# Patient Record
Sex: FEMALE | Race: WHITE | NOT HISPANIC OR LATINO | URBAN - METROPOLITAN AREA
[De-identification: names, ages, dates, MRNs, and addresses within clinical notes are randomized per-mention and may not be internally consistent; named-entity substitution may affect disease eponyms.]

---

## 2018-07-26 ENCOUNTER — CONVERSION ENCOUNTER (OUTPATIENT)
Dept: SURGERY | Facility: CLINIC | Age: 58
End: 2018-07-26

## 2018-07-26 ENCOUNTER — OFFICE VISIT CONVERTED (OUTPATIENT)
Dept: SURGERY | Facility: CLINIC | Age: 58
End: 2018-07-26
Attending: NURSE PRACTITIONER

## 2018-08-13 ENCOUNTER — OFFICE VISIT CONVERTED (OUTPATIENT)
Dept: SURGERY | Facility: CLINIC | Age: 58
End: 2018-08-13
Attending: SURGERY

## 2018-08-16 ENCOUNTER — OFFICE VISIT CONVERTED (OUTPATIENT)
Dept: ONCOLOGY | Facility: HOSPITAL | Age: 58
End: 2018-08-16
Attending: INTERNAL MEDICINE

## 2018-08-21 ENCOUNTER — OFFICE VISIT CONVERTED (OUTPATIENT)
Dept: ONCOLOGY | Facility: HOSPITAL | Age: 58
End: 2018-08-21
Attending: THORACIC SURGERY (CARDIOTHORACIC VASCULAR SURGERY)

## 2018-08-28 ENCOUNTER — TELEPHONE CONVERTED (OUTPATIENT)
Dept: ONCOLOGY | Facility: HOSPITAL | Age: 58
End: 2018-08-28

## 2018-08-30 ENCOUNTER — OFFICE VISIT CONVERTED (OUTPATIENT)
Dept: ONCOLOGY | Facility: HOSPITAL | Age: 58
End: 2018-08-30
Attending: INTERNAL MEDICINE

## 2018-09-06 ENCOUNTER — OFFICE VISIT CONVERTED (OUTPATIENT)
Dept: ONCOLOGY | Facility: HOSPITAL | Age: 58
End: 2018-09-06
Attending: INTERNAL MEDICINE

## 2018-09-07 ENCOUNTER — OFFICE VISIT CONVERTED (OUTPATIENT)
Dept: SURGERY | Facility: CLINIC | Age: 58
End: 2018-09-07
Attending: SURGERY

## 2018-09-13 ENCOUNTER — OFFICE VISIT CONVERTED (OUTPATIENT)
Dept: ONCOLOGY | Facility: HOSPITAL | Age: 58
End: 2018-09-13
Attending: INTERNAL MEDICINE

## 2018-09-18 ENCOUNTER — OFFICE VISIT CONVERTED (OUTPATIENT)
Dept: ONCOLOGY | Facility: HOSPITAL | Age: 58
End: 2018-09-18
Attending: THORACIC SURGERY (CARDIOTHORACIC VASCULAR SURGERY)

## 2018-09-20 ENCOUNTER — OFFICE VISIT CONVERTED (OUTPATIENT)
Dept: ONCOLOGY | Facility: HOSPITAL | Age: 58
End: 2018-09-20
Attending: INTERNAL MEDICINE

## 2018-09-27 ENCOUNTER — OFFICE VISIT CONVERTED (OUTPATIENT)
Dept: ONCOLOGY | Facility: HOSPITAL | Age: 58
End: 2018-09-27
Attending: INTERNAL MEDICINE

## 2018-11-06 ENCOUNTER — OFFICE VISIT CONVERTED (OUTPATIENT)
Dept: ONCOLOGY | Facility: HOSPITAL | Age: 58
End: 2018-11-06
Attending: INTERNAL MEDICINE

## 2019-01-03 ENCOUNTER — HOSPITAL ENCOUNTER (OUTPATIENT)
Dept: GENERAL RADIOLOGY | Facility: HOSPITAL | Age: 59
Discharge: HOME | End: 2019-01-03
Attending: THORACIC SURGERY (CARDIOTHORACIC VASCULAR SURGERY)

## 2019-01-08 ENCOUNTER — HOSPITAL ENCOUNTER (OUTPATIENT)
Dept: ONCOLOGY | Facility: HOSPITAL | Age: 59
Discharge: HOME | End: 2019-01-08
Attending: THORACIC SURGERY (CARDIOTHORACIC VASCULAR SURGERY)

## 2019-01-08 ENCOUNTER — OFFICE VISIT CONVERTED (OUTPATIENT)
Dept: ONCOLOGY | Facility: HOSPITAL | Age: 59
End: 2019-01-08
Attending: THORACIC SURGERY (CARDIOTHORACIC VASCULAR SURGERY)

## 2019-01-15 ENCOUNTER — OFFICE VISIT CONVERTED (OUTPATIENT)
Dept: ONCOLOGY | Facility: HOSPITAL | Age: 59
End: 2019-01-15
Attending: INTERNAL MEDICINE

## 2019-01-15 ENCOUNTER — HOSPITAL ENCOUNTER (OUTPATIENT)
Dept: OTHER | Facility: HOSPITAL | Age: 59
Discharge: HOME | End: 2019-01-15
Attending: INTERNAL MEDICINE

## 2019-01-15 LAB
ALBUMIN SERPL-MCNC: 2.9 G/DL (ref 3.5–5)
ALBUMIN/GLOB SERPL: 0.8 {RATIO} (ref 1.4–2.6)
ALP SERPL-CCNC: 63 U/L (ref 53–141)
ALT SERPL-CCNC: 10 U/L (ref 10–40)
ANION GAP SERPL CALC-SCNC: 15 MMOL/L (ref 8–19)
AST SERPL-CCNC: 21 U/L (ref 15–50)
BASOPHILS # BLD AUTO: 0.02 10*3/UL (ref 0–0.2)
BASOPHILS NFR BLD AUTO: 0.48 % (ref 0–3)
BILIRUB SERPL-MCNC: 0.22 MG/DL (ref 0.2–1.3)
BUN SERPL-MCNC: 12 MG/DL (ref 5–25)
BUN/CREAT SERPL: 12 {RATIO} (ref 6–20)
CALCIUM SERPL-MCNC: 9.1 MG/DL (ref 8.7–10.4)
CHLORIDE SERPL-SCNC: 107 MMOL/L (ref 99–111)
CONV CO2: 25 MMOL/L (ref 22–32)
CONV TOTAL PROTEIN: 6.5 G/DL (ref 6.3–8.2)
CREAT UR-MCNC: 0.99 MG/DL (ref 0.5–0.9)
EOSINOPHIL # BLD AUTO: 0.13 10*3/UL (ref 0–0.7)
EOSINOPHIL # BLD AUTO: 2.82 % (ref 0–7)
ERYTHROCYTE [DISTWIDTH] IN BLOOD BY AUTOMATED COUNT: 12.3 % (ref 11.5–14.5)
GFR SERPLBLD BASED ON 1.73 SQ M-ARVRAT: >60 ML/MIN/{1.73_M2}
GLOBULIN UR ELPH-MCNC: 3.6 G/DL (ref 2–3.5)
GLUCOSE SERPL-MCNC: 96 MG/DL (ref 65–99)
HBA1C MFR BLD: 10.4 G/DL (ref 12–16)
HCT VFR BLD AUTO: 30.5 % (ref 37–47)
LYMPHOCYTES # BLD AUTO: 1.16 10*3/UL (ref 1–5)
MAGNESIUM SERPL-MCNC: 1.98 MG/DL (ref 1.6–2.3)
MCH RBC QN AUTO: 31.3 PG (ref 27–31)
MCHC RBC AUTO-ENTMCNC: 34.2 G/DL (ref 33–37)
MCV RBC AUTO: 91.6 FL (ref 81–99)
MONOCYTES # BLD AUTO: 0.51 10*3/UL (ref 0.2–1.2)
MONOCYTES NFR BLD AUTO: 11.1 % (ref 3–10)
NEUTROPHILS # BLD AUTO: 2.8 10*3/UL (ref 2–8)
NEUTROPHILS NFR BLD AUTO: 60.6 % (ref 30–85)
NRBC BLD AUTO-RTO: 0 % (ref 0–0.01)
OSMOLALITY SERPL CALC.SUM OF ELEC: 296 MOSM/KG (ref 273–304)
PLATELET # BLD AUTO: 201 10*3/UL (ref 130–400)
PMV BLD AUTO: 6.8 FL (ref 7.4–10.4)
POTASSIUM SERPL-SCNC: 3.7 MMOL/L (ref 3.5–5.3)
RBC # BLD AUTO: 3.33 10*6/UL (ref 4.2–5.4)
SODIUM SERPL-SCNC: 143 MMOL/L (ref 135–147)
VARIANT LYMPHS NFR BLD MANUAL: 25 % (ref 20–45)
WBC # BLD AUTO: 4.62 10*3/UL (ref 4.8–10.8)

## 2019-01-24 ENCOUNTER — HOSPITAL ENCOUNTER (OUTPATIENT)
Dept: OTHER | Facility: HOSPITAL | Age: 59
Setting detail: RECURRING SERIES
Discharge: HOME | End: 2019-01-31
Attending: INTERNAL MEDICINE

## 2019-02-05 ENCOUNTER — HOSPITAL ENCOUNTER (OUTPATIENT)
Dept: OTHER | Facility: HOSPITAL | Age: 59
Discharge: HOME | End: 2019-02-05
Attending: INTERNAL MEDICINE

## 2019-02-05 ENCOUNTER — OFFICE VISIT CONVERTED (OUTPATIENT)
Dept: ONCOLOGY | Facility: HOSPITAL | Age: 59
End: 2019-02-05
Attending: INTERNAL MEDICINE

## 2019-02-14 ENCOUNTER — HOSPITAL ENCOUNTER (OUTPATIENT)
Dept: ONCOLOGY | Facility: HOSPITAL | Age: 59
Discharge: HOME | End: 2019-02-14
Attending: INTERNAL MEDICINE

## 2019-02-14 ENCOUNTER — OFFICE VISIT CONVERTED (OUTPATIENT)
Dept: ONCOLOGY | Facility: HOSPITAL | Age: 59
End: 2019-02-14
Attending: INTERNAL MEDICINE

## 2019-02-14 ENCOUNTER — HOSPITAL ENCOUNTER (OUTPATIENT)
Dept: OTHER | Facility: HOSPITAL | Age: 59
Setting detail: RECURRING SERIES
Discharge: HOME | End: 2019-02-28
Attending: INTERNAL MEDICINE

## 2019-02-14 LAB
ALBUMIN SERPL-MCNC: 2.9 G/DL (ref 3.5–5)
ALBUMIN/GLOB SERPL: 0.8 {RATIO} (ref 1.4–2.6)
ALP SERPL-CCNC: 78 U/L (ref 53–141)
ALT SERPL-CCNC: 11 U/L (ref 10–40)
ANION GAP SERPL CALC-SCNC: 15 MMOL/L (ref 8–19)
AST SERPL-CCNC: 25 U/L (ref 15–50)
BASOPHILS # BLD AUTO: 0.1 10*3/UL (ref 0–0.2)
BASOPHILS NFR BLD AUTO: 2.49 % (ref 0–3)
BILIRUB SERPL-MCNC: <0.15 MG/DL (ref 0.2–1.3)
BUN SERPL-MCNC: 11 MG/DL (ref 5–25)
BUN/CREAT SERPL: 12 {RATIO} (ref 6–20)
CALCIUM SERPL-MCNC: 9.5 MG/DL (ref 8.7–10.4)
CHLORIDE SERPL-SCNC: 106 MMOL/L (ref 99–111)
CONV CO2: 24 MMOL/L (ref 22–32)
CONV TOTAL PROTEIN: 6.5 G/DL (ref 6.3–8.2)
CREAT UR-MCNC: 0.94 MG/DL (ref 0.5–0.9)
EOSINOPHIL # BLD AUTO: 0.12 10*3/UL (ref 0–0.7)
EOSINOPHIL # BLD AUTO: 3.25 % (ref 0–7)
ERYTHROCYTE [DISTWIDTH] IN BLOOD BY AUTOMATED COUNT: 12.9 % (ref 11.7–14.4)
GFR SERPLBLD BASED ON 1.73 SQ M-ARVRAT: >60 ML/MIN/{1.73_M2}
GLOBULIN UR ELPH-MCNC: 3.6 G/DL (ref 2–3.5)
GLUCOSE SERPL-MCNC: 106 MG/DL (ref 65–99)
HBA1C MFR BLD: 11.3 G/DL (ref 12–16)
HCT VFR BLD AUTO: 32.9 % (ref 37–47)
LYMPHOCYTES # BLD AUTO: 1.16 10*3/UL (ref 1–5)
MAGNESIUM SERPL-MCNC: 2 MG/DL (ref 1.6–2.3)
MCH RBC QN AUTO: 30.8 PG (ref 27–31)
MCHC RBC AUTO-ENTMCNC: 34.3 G/DL (ref 33–37)
MCV RBC AUTO: 89.6 FL (ref 81–99)
MONOCYTES # BLD AUTO: 0.61 10*3/UL (ref 0.2–1.2)
MONOCYTES NFR BLD AUTO: 16 % (ref 3–10)
NEUTROPHILS # BLD AUTO: 1.81 10*3/UL (ref 2–8)
NEUTROPHILS NFR BLD AUTO: 47.6 % (ref 30–85)
NRBC BLD AUTO-RTO: 0 % (ref 0–0.01)
OSMOLALITY SERPL CALC.SUM OF ELEC: 292 MOSM/KG (ref 273–304)
PLATELET # BLD AUTO: 162 10*3/UL (ref 130–400)
PMV BLD AUTO: 6.9 FL (ref 9.4–12.3)
POTASSIUM SERPL-SCNC: 3.9 MMOL/L (ref 3.5–5.3)
RBC # BLD AUTO: 3.66 10*6/UL (ref 4.2–5.4)
SODIUM SERPL-SCNC: 141 MMOL/L (ref 135–147)
VARIANT LYMPHS NFR BLD MANUAL: 30.6 % (ref 20–45)
WBC # BLD AUTO: 3.8 10*3/UL (ref 4.8–10.8)

## 2019-03-06 ENCOUNTER — HOSPITAL ENCOUNTER (OUTPATIENT)
Dept: OTHER | Facility: HOSPITAL | Age: 59
Setting detail: RECURRING SERIES
Discharge: HOME | End: 2019-03-31
Attending: INTERNAL MEDICINE

## 2019-03-06 LAB
ALBUMIN SERPL-MCNC: 3.6 G/DL (ref 3.5–5)
ALBUMIN/GLOB SERPL: 1.1 {RATIO} (ref 1.4–2.6)
ALP SERPL-CCNC: 68 U/L (ref 53–141)
ALT SERPL-CCNC: 11 U/L (ref 10–40)
ANION GAP SERPL CALC-SCNC: 16 MMOL/L (ref 8–19)
AST SERPL-CCNC: 24 U/L (ref 15–50)
BASOPHILS # BLD AUTO: 0.03 10*3/UL (ref 0–0.2)
BASOPHILS NFR BLD AUTO: 0.6 % (ref 0–3)
BILIRUB SERPL-MCNC: 0.37 MG/DL (ref 0.2–1.3)
BUN SERPL-MCNC: 20 MG/DL (ref 5–25)
BUN/CREAT SERPL: 20 {RATIO} (ref 6–20)
CALCIUM SERPL-MCNC: 9.7 MG/DL (ref 8.7–10.4)
CHLORIDE SERPL-SCNC: 103 MMOL/L (ref 99–111)
CONV ABS IMM GRAN: 0.01 10*3/UL (ref 0–0.2)
CONV CO2: 25 MMOL/L (ref 22–32)
CONV IMMATURE GRAN: 0.2 % (ref 0–1.8)
CONV TOTAL PROTEIN: 7 G/DL (ref 6.3–8.2)
CREAT UR-MCNC: 1 MG/DL (ref 0.5–0.9)
DEPRECATED RDW RBC AUTO: 46.8 FL (ref 36.4–46.3)
EOSINOPHIL # BLD AUTO: 0.05 10*3/UL (ref 0–0.7)
EOSINOPHIL # BLD AUTO: 1.1 % (ref 0–7)
ERYTHROCYTE [DISTWIDTH] IN BLOOD BY AUTOMATED COUNT: 15.9 % (ref 11.7–14.4)
GFR SERPLBLD BASED ON 1.73 SQ M-ARVRAT: >60 ML/MIN/{1.73_M2}
GLOBULIN UR ELPH-MCNC: 3.4 G/DL (ref 2–3.5)
GLUCOSE SERPL-MCNC: 136 MG/DL (ref 65–99)
HBA1C MFR BLD: 12.3 G/DL (ref 12–16)
HCT VFR BLD AUTO: 37.6 % (ref 37–47)
LYMPHOCYTES # BLD AUTO: 1.09 10*3/UL (ref 1–5)
MAGNESIUM SERPL-MCNC: 1.84 MG/DL (ref 1.6–2.3)
MCH RBC QN AUTO: 29.9 PG (ref 27–31)
MCHC RBC AUTO-ENTMCNC: 32.7 G/DL (ref 33–37)
MCV RBC AUTO: 91.5 FL (ref 81–99)
MONOCYTES # BLD AUTO: 0.87 10*3/UL (ref 0.2–1.2)
MONOCYTES NFR BLD AUTO: 18.6 % (ref 3–10)
NEUTROPHILS # BLD AUTO: 2.63 10*3/UL (ref 2–8)
NEUTROPHILS NFR BLD AUTO: 56.2 % (ref 30–85)
NRBC CBCN: 0 % (ref 0–0.7)
OSMOLALITY SERPL CALC.SUM OF ELEC: 297 MOSM/KG (ref 273–304)
PLATELET # BLD AUTO: 137 10*3/UL (ref 130–400)
PMV BLD AUTO: 10.1 FL (ref 9.4–12.3)
POTASSIUM SERPL-SCNC: 3.2 MMOL/L (ref 3.5–5.3)
RBC # BLD AUTO: 4.11 10*6/UL (ref 4.2–5.4)
SODIUM SERPL-SCNC: 141 MMOL/L (ref 135–147)
VARIANT LYMPHS NFR BLD MANUAL: 23.3 % (ref 20–45)
WBC # BLD AUTO: 4.68 10*3/UL (ref 4.8–10.8)

## 2019-03-07 ENCOUNTER — OFFICE VISIT CONVERTED (OUTPATIENT)
Dept: ONCOLOGY | Facility: HOSPITAL | Age: 59
End: 2019-03-07
Attending: INTERNAL MEDICINE

## 2019-03-26 ENCOUNTER — HOSPITAL ENCOUNTER (OUTPATIENT)
Dept: GENERAL RADIOLOGY | Facility: HOSPITAL | Age: 59
Discharge: HOME | End: 2019-03-26
Attending: THORACIC SURGERY (CARDIOTHORACIC VASCULAR SURGERY)

## 2019-03-26 LAB
CREAT BLD-MCNC: 0.9 MG/DL (ref 0.6–1.4)
GFR SERPLBLD BASED ON 1.73 SQ M-ARVRAT: >60 ML/MIN/{1.73_M2}

## 2019-04-02 ENCOUNTER — OFFICE VISIT CONVERTED (OUTPATIENT)
Dept: ONCOLOGY | Facility: HOSPITAL | Age: 59
End: 2019-04-02
Attending: THORACIC SURGERY (CARDIOTHORACIC VASCULAR SURGERY)

## 2019-04-02 ENCOUNTER — HOSPITAL ENCOUNTER (OUTPATIENT)
Dept: OTHER | Facility: HOSPITAL | Age: 59
Discharge: HOME | End: 2019-04-02
Attending: THORACIC SURGERY (CARDIOTHORACIC VASCULAR SURGERY)

## 2019-04-17 ENCOUNTER — OFFICE VISIT CONVERTED (OUTPATIENT)
Dept: ONCOLOGY | Facility: HOSPITAL | Age: 59
End: 2019-04-17
Attending: INTERNAL MEDICINE

## 2019-04-17 ENCOUNTER — HOSPITAL ENCOUNTER (OUTPATIENT)
Dept: OTHER | Facility: HOSPITAL | Age: 59
Discharge: HOME | End: 2019-04-17
Attending: INTERNAL MEDICINE

## 2019-04-17 LAB
ALBUMIN SERPL-MCNC: 3.5 G/DL (ref 3.5–5)
ALBUMIN/GLOB SERPL: 1.1 {RATIO} (ref 1.4–2.6)
ALP SERPL-CCNC: 64 U/L (ref 53–141)
ALT SERPL-CCNC: 12 U/L (ref 10–40)
ANION GAP SERPL CALC-SCNC: 13 MMOL/L (ref 8–19)
AST SERPL-CCNC: 25 U/L (ref 15–50)
BASOPHILS # BLD AUTO: 0.03 10*3/UL (ref 0–0.2)
BASOPHILS NFR BLD AUTO: 0.6 % (ref 0–3)
BILIRUB SERPL-MCNC: 0.24 MG/DL (ref 0.2–1.3)
BUN SERPL-MCNC: 16 MG/DL (ref 5–25)
BUN/CREAT SERPL: 17 {RATIO} (ref 6–20)
CALCIUM SERPL-MCNC: 9.1 MG/DL (ref 8.7–10.4)
CHLORIDE SERPL-SCNC: 110 MMOL/L (ref 99–111)
CONV ABS IMM GRAN: 0.01 10*3/UL (ref 0–0.2)
CONV CO2: 24 MMOL/L (ref 22–32)
CONV IMMATURE GRAN: 0.2 % (ref 0–1.8)
CONV TOTAL PROTEIN: 6.6 G/DL (ref 6.3–8.2)
CREAT UR-MCNC: 0.95 MG/DL (ref 0.5–0.9)
DEPRECATED RDW RBC AUTO: 50.5 FL (ref 36.4–46.3)
EOSINOPHIL # BLD AUTO: 0.19 10*3/UL (ref 0–0.7)
EOSINOPHIL # BLD AUTO: 4 % (ref 0–7)
ERYTHROCYTE [DISTWIDTH] IN BLOOD BY AUTOMATED COUNT: 14.5 % (ref 11.7–14.4)
GFR SERPLBLD BASED ON 1.73 SQ M-ARVRAT: >60 ML/MIN/{1.73_M2}
GLOBULIN UR ELPH-MCNC: 3.1 G/DL (ref 2–3.5)
GLUCOSE SERPL-MCNC: 101 MG/DL (ref 65–99)
HBA1C MFR BLD: 10.6 G/DL (ref 12–16)
HCT VFR BLD AUTO: 32.4 % (ref 37–47)
LYMPHOCYTES # BLD AUTO: 1.1 10*3/UL (ref 1–5)
MCH RBC QN AUTO: 31.1 PG (ref 27–31)
MCHC RBC AUTO-ENTMCNC: 32.7 G/DL (ref 33–37)
MCV RBC AUTO: 95 FL (ref 81–99)
MONOCYTES # BLD AUTO: 0.66 10*3/UL (ref 0.2–1.2)
MONOCYTES NFR BLD AUTO: 13.7 % (ref 3–10)
NEUTROPHILS # BLD AUTO: 2.82 10*3/UL (ref 2–8)
NEUTROPHILS NFR BLD AUTO: 58.6 % (ref 30–85)
NRBC CBCN: 0 % (ref 0–0.7)
OSMOLALITY SERPL CALC.SUM OF ELEC: 297 MOSM/KG (ref 273–304)
PLATELET # BLD AUTO: 178 10*3/UL (ref 130–400)
PMV BLD AUTO: 9.8 FL (ref 9.4–12.3)
POTASSIUM SERPL-SCNC: 3.7 MMOL/L (ref 3.5–5.3)
RBC # BLD AUTO: 3.41 10*6/UL (ref 4.2–5.4)
SODIUM SERPL-SCNC: 143 MMOL/L (ref 135–147)
VARIANT LYMPHS NFR BLD MANUAL: 22.9 % (ref 20–45)
WBC # BLD AUTO: 4.81 10*3/UL (ref 4.8–10.8)

## 2019-05-02 ENCOUNTER — HOSPITAL ENCOUNTER (OUTPATIENT)
Dept: LAB | Facility: HOSPITAL | Age: 59
Discharge: HOME | End: 2019-05-02
Attending: DERMATOLOGY

## 2019-05-02 LAB
ALBUMIN SERPL-MCNC: 3.6 G/DL (ref 3.5–5)
ALP SERPL-CCNC: 64 U/L (ref 53–141)
ALT SERPL-CCNC: 10 U/L (ref 10–40)
AST SERPL-CCNC: 19 U/L (ref 15–50)
BASOPHILS # BLD AUTO: 0.03 10*3/UL (ref 0–0.2)
BASOPHILS NFR BLD AUTO: 0.6 % (ref 0–3)
BILIRUB SERPL-MCNC: 0.34 MG/DL (ref 0.2–1.3)
CHOLEST SERPL-MCNC: 212 MG/DL (ref 107–200)
CHOLEST/HDLC SERPL: 3.4 {RATIO} (ref 3–6)
CONV ABS IMM GRAN: 0.01 10*3/UL (ref 0–0.2)
CONV BILI, CONJUGATED: <0.2 MG/DL (ref 0–0.6)
CONV IMMATURE GRAN: 0.2 % (ref 0–1.8)
CONV TOTAL PROTEIN: 6.9 G/DL (ref 6.3–8.2)
CONV UNCONJUGATED BILIRUBIN: 0.1 MG/DL (ref 0–1.1)
DEPRECATED RDW RBC AUTO: 50.7 FL (ref 36.4–46.3)
EOSINOPHIL # BLD AUTO: 0.2 10*3/UL (ref 0–0.7)
EOSINOPHIL # BLD AUTO: 4.3 % (ref 0–7)
ERYTHROCYTE [DISTWIDTH] IN BLOOD BY AUTOMATED COUNT: 14 % (ref 11.7–14.4)
HBA1C MFR BLD: 11.7 G/DL (ref 12–16)
HCT VFR BLD AUTO: 37.3 % (ref 37–47)
HDLC SERPL-MCNC: 63 MG/DL (ref 40–60)
LDLC SERPL CALC-MCNC: 126 MG/DL (ref 70–100)
LYMPHOCYTES # BLD AUTO: 0.93 10*3/UL (ref 1–5)
MCH RBC QN AUTO: 30.8 PG (ref 27–31)
MCHC RBC AUTO-ENTMCNC: 31.4 G/DL (ref 33–37)
MCV RBC AUTO: 98.2 FL (ref 81–99)
MONOCYTES # BLD AUTO: 0.59 10*3/UL (ref 0.2–1.2)
MONOCYTES NFR BLD AUTO: 12.6 % (ref 3–10)
NEUTROPHILS # BLD AUTO: 2.91 10*3/UL (ref 2–8)
NEUTROPHILS NFR BLD AUTO: 62.4 % (ref 30–85)
NRBC CBCN: 0 % (ref 0–0.7)
PLATELET # BLD AUTO: 209 10*3/UL (ref 130–400)
PMV BLD AUTO: 9.5 FL (ref 9.4–12.3)
RBC # BLD AUTO: 3.8 10*6/UL (ref 4.2–5.4)
TRIGL SERPL-MCNC: 117 MG/DL (ref 40–150)
VARIANT LYMPHS NFR BLD MANUAL: 19.9 % (ref 20–45)
VLDLC SERPL-MCNC: 23 MG/DL (ref 5–37)
WBC # BLD AUTO: 4.67 10*3/UL (ref 4.8–10.8)

## 2019-07-01 ENCOUNTER — HOSPITAL ENCOUNTER (OUTPATIENT)
Dept: LAB | Facility: HOSPITAL | Age: 59
Discharge: HOME | End: 2019-07-01
Attending: DERMATOLOGY

## 2019-07-01 LAB
ALBUMIN SERPL-MCNC: 3.8 G/DL (ref 3.5–5)
ALP SERPL-CCNC: 62 U/L (ref 53–141)
ALT SERPL-CCNC: 11 U/L (ref 10–40)
AST SERPL-CCNC: 21 U/L (ref 15–50)
BASOPHILS # BLD AUTO: 0.04 10*3/UL (ref 0–0.2)
BASOPHILS NFR BLD AUTO: 0.9 % (ref 0–3)
BILIRUB SERPL-MCNC: 0.36 MG/DL (ref 0.2–1.3)
CHOLEST SERPL-MCNC: 179 MG/DL (ref 107–200)
CHOLEST/HDLC SERPL: 3.4 {RATIO} (ref 3–6)
CONV ABS IMM GRAN: 0.02 10*3/UL (ref 0–0.2)
CONV BILI, CONJUGATED: <0.2 MG/DL (ref 0–0.6)
CONV IMMATURE GRAN: 0.4 % (ref 0–1.8)
CONV TOTAL PROTEIN: 7 G/DL (ref 6.3–8.2)
CONV UNCONJUGATED BILIRUBIN: 0.2 MG/DL (ref 0–1.1)
DEPRECATED RDW RBC AUTO: 44.3 FL (ref 36.4–46.3)
EOSINOPHIL # BLD AUTO: 0.23 10*3/UL (ref 0–0.7)
EOSINOPHIL # BLD AUTO: 4.9 % (ref 0–7)
ERYTHROCYTE [DISTWIDTH] IN BLOOD BY AUTOMATED COUNT: 12.4 % (ref 11.7–14.4)
HBA1C MFR BLD: 10.9 G/DL (ref 12–16)
HCT VFR BLD AUTO: 34.8 % (ref 37–47)
HDLC SERPL-MCNC: 53 MG/DL (ref 40–60)
LDLC SERPL CALC-MCNC: 105 MG/DL (ref 70–100)
LYMPHOCYTES # BLD AUTO: 1.06 10*3/UL (ref 1–5)
MCH RBC QN AUTO: 30.4 PG (ref 27–31)
MCHC RBC AUTO-ENTMCNC: 31.3 G/DL (ref 33–37)
MCV RBC AUTO: 96.9 FL (ref 81–99)
MONOCYTES # BLD AUTO: 0.67 10*3/UL (ref 0.2–1.2)
MONOCYTES NFR BLD AUTO: 14.3 % (ref 3–10)
NEUTROPHILS # BLD AUTO: 2.65 10*3/UL (ref 2–8)
NEUTROPHILS NFR BLD AUTO: 56.8 % (ref 30–85)
NRBC CBCN: 0 % (ref 0–0.7)
PLATELET # BLD AUTO: 191 10*3/UL (ref 130–400)
PMV BLD AUTO: 9.4 FL (ref 9.4–12.3)
RBC # BLD AUTO: 3.59 10*6/UL (ref 4.2–5.4)
TRIGL SERPL-MCNC: 107 MG/DL (ref 40–150)
VARIANT LYMPHS NFR BLD MANUAL: 22.7 % (ref 20–45)
VLDLC SERPL-MCNC: 21 MG/DL (ref 5–37)
WBC # BLD AUTO: 4.67 10*3/UL (ref 4.8–10.8)

## 2019-08-02 ENCOUNTER — HOSPITAL ENCOUNTER (OUTPATIENT)
Dept: GENERAL RADIOLOGY | Facility: HOSPITAL | Age: 59
Discharge: HOME | End: 2019-08-02
Attending: THORACIC SURGERY (CARDIOTHORACIC VASCULAR SURGERY)

## 2019-08-02 LAB
CREAT BLD-MCNC: 1.2 MG/DL (ref 0.6–1.4)
GFR SERPLBLD BASED ON 1.73 SQ M-ARVRAT: 49 ML/MIN/{1.73_M2}

## 2019-08-13 ENCOUNTER — HOSPITAL ENCOUNTER (OUTPATIENT)
Dept: OTHER | Facility: HOSPITAL | Age: 59
Discharge: HOME | End: 2019-08-13
Attending: INTERNAL MEDICINE

## 2019-08-13 ENCOUNTER — OFFICE VISIT CONVERTED (OUTPATIENT)
Dept: ONCOLOGY | Facility: HOSPITAL | Age: 59
End: 2019-08-13
Attending: THORACIC SURGERY (CARDIOTHORACIC VASCULAR SURGERY)

## 2019-08-19 ENCOUNTER — HOSPITAL ENCOUNTER (OUTPATIENT)
Dept: PET IMAGING | Facility: HOSPITAL | Age: 59
Discharge: HOME | End: 2019-08-19
Attending: THORACIC SURGERY (CARDIOTHORACIC VASCULAR SURGERY)

## 2019-08-27 ENCOUNTER — HOSPITAL ENCOUNTER (OUTPATIENT)
Dept: OTHER | Facility: HOSPITAL | Age: 59
Discharge: HOME | End: 2019-08-27
Attending: THORACIC SURGERY (CARDIOTHORACIC VASCULAR SURGERY)

## 2019-08-27 ENCOUNTER — OFFICE VISIT CONVERTED (OUTPATIENT)
Dept: ONCOLOGY | Facility: HOSPITAL | Age: 59
End: 2019-08-27
Attending: THORACIC SURGERY (CARDIOTHORACIC VASCULAR SURGERY)

## 2019-08-30 ENCOUNTER — OFFICE VISIT CONVERTED (OUTPATIENT)
Dept: SURGERY | Facility: CLINIC | Age: 59
End: 2019-08-30
Attending: SURGERY

## 2019-09-04 ENCOUNTER — HOSPITAL ENCOUNTER (OUTPATIENT)
Dept: PERIOP | Facility: HOSPITAL | Age: 59
Setting detail: HOSPITAL OUTPATIENT SURGERY
Discharge: HOME | End: 2019-09-04
Attending: SURGERY

## 2019-09-09 LAB — CONV LYMPHOMA/LEUKEMIA PROFILE SOLID TISSUE OR FLUID: NORMAL

## 2019-09-12 ENCOUNTER — HOSPITAL ENCOUNTER (OUTPATIENT)
Dept: OTHER | Facility: HOSPITAL | Age: 59
Discharge: HOME | End: 2019-09-12
Attending: INTERNAL MEDICINE

## 2019-09-12 ENCOUNTER — OFFICE VISIT CONVERTED (OUTPATIENT)
Dept: ONCOLOGY | Facility: HOSPITAL | Age: 59
End: 2019-09-12
Attending: INTERNAL MEDICINE

## 2019-09-12 LAB
ALBUMIN SERPL-MCNC: 4 G/DL (ref 3.5–5)
ALBUMIN/GLOB SERPL: 1.1 {RATIO} (ref 1.4–2.6)
ALP SERPL-CCNC: 59 U/L (ref 53–141)
ALT SERPL-CCNC: <5 U/L (ref 10–40)
ANION GAP SERPL CALC-SCNC: 16 MMOL/L (ref 8–19)
AST SERPL-CCNC: 19 U/L (ref 15–50)
BASOPHILS # BLD AUTO: 0.02 10*3/UL (ref 0–0.2)
BASOPHILS NFR BLD AUTO: 0.4 % (ref 0–3)
BILIRUB SERPL-MCNC: 0.24 MG/DL (ref 0.2–1.3)
BUN SERPL-MCNC: 20 MG/DL (ref 5–25)
BUN/CREAT SERPL: 22 {RATIO} (ref 6–20)
CALCIUM SERPL-MCNC: 9.2 MG/DL (ref 8.7–10.4)
CHLORIDE SERPL-SCNC: 108 MMOL/L (ref 99–111)
CONV ABS IMM GRAN: 0.01 10*3/UL (ref 0–0.2)
CONV CO2: 24 MMOL/L (ref 22–32)
CONV IMMATURE GRAN: 0.2 % (ref 0–1.8)
CONV TOTAL PROTEIN: 7.6 G/DL (ref 6.3–8.2)
CREAT UR-MCNC: 0.92 MG/DL (ref 0.5–0.9)
DEPRECATED RDW RBC AUTO: 43.8 FL (ref 36.4–46.3)
EOSINOPHIL # BLD AUTO: 0.09 10*3/UL (ref 0–0.7)
EOSINOPHIL # BLD AUTO: 1.6 % (ref 0–7)
ERYTHROCYTE [DISTWIDTH] IN BLOOD BY AUTOMATED COUNT: 12.8 % (ref 11.7–14.4)
GFR SERPLBLD BASED ON 1.73 SQ M-ARVRAT: >60 ML/MIN/{1.73_M2}
GLOBULIN UR ELPH-MCNC: 3.6 G/DL (ref 2–3.5)
GLUCOSE SERPL-MCNC: 90 MG/DL (ref 65–99)
HCT VFR BLD AUTO: 35.5 % (ref 37–47)
HGB BLD-MCNC: 11.5 G/DL (ref 12–16)
LYMPHOCYTES # BLD AUTO: 1.3 10*3/UL (ref 1–5)
LYMPHOCYTES NFR BLD AUTO: 23.4 % (ref 20–45)
MCH RBC QN AUTO: 30.4 PG (ref 27–31)
MCHC RBC AUTO-ENTMCNC: 32.4 G/DL (ref 33–37)
MCV RBC AUTO: 93.9 FL (ref 81–99)
MONOCYTES # BLD AUTO: 0.69 10*3/UL (ref 0.2–1.2)
MONOCYTES NFR BLD AUTO: 12.4 % (ref 3–10)
NEUTROPHILS # BLD AUTO: 3.45 10*3/UL (ref 2–8)
NEUTROPHILS NFR BLD AUTO: 62 % (ref 30–85)
NRBC CBCN: 0 % (ref 0–0.7)
OSMOLALITY SERPL CALC.SUM OF ELEC: 300 MOSM/KG (ref 273–304)
PLATELET # BLD AUTO: 213 10*3/UL (ref 130–400)
PMV BLD AUTO: 9.4 FL (ref 9.4–12.3)
POTASSIUM SERPL-SCNC: 4 MMOL/L (ref 3.5–5.3)
RBC # BLD AUTO: 3.78 10*6/UL (ref 4.2–5.4)
SODIUM SERPL-SCNC: 144 MMOL/L (ref 135–147)
WBC # BLD AUTO: 5.56 10*3/UL (ref 4.8–10.8)

## 2019-09-20 ENCOUNTER — HOSPITAL ENCOUNTER (OUTPATIENT)
Dept: OTHER | Facility: HOSPITAL | Age: 59
Discharge: HOME | End: 2019-09-20
Attending: INTERNAL MEDICINE

## 2019-09-20 ENCOUNTER — CONVERSION ENCOUNTER (OUTPATIENT)
Dept: ONCOLOGY | Facility: HOSPITAL | Age: 59
End: 2019-09-20

## 2019-09-23 ENCOUNTER — HOSPITAL ENCOUNTER (OUTPATIENT)
Dept: GENERAL RADIOLOGY | Facility: HOSPITAL | Age: 59
Discharge: HOME | End: 2019-09-23
Attending: NURSE PRACTITIONER

## 2019-09-24 ENCOUNTER — HOSPITAL ENCOUNTER (OUTPATIENT)
Dept: OTHER | Facility: HOSPITAL | Age: 59
Discharge: HOME | End: 2019-09-24
Attending: INTERNAL MEDICINE

## 2019-09-24 ENCOUNTER — OFFICE VISIT CONVERTED (OUTPATIENT)
Dept: ONCOLOGY | Facility: HOSPITAL | Age: 59
End: 2019-09-24
Attending: INTERNAL MEDICINE

## 2019-10-14 ENCOUNTER — HOSPITAL ENCOUNTER (OUTPATIENT)
Dept: CARDIOLOGY | Facility: HOSPITAL | Age: 59
Discharge: HOME | End: 2019-10-14
Attending: INTERNAL MEDICINE

## 2019-10-15 ENCOUNTER — HOSPITAL ENCOUNTER (OUTPATIENT)
Dept: MRI IMAGING | Facility: HOSPITAL | Age: 59
Discharge: HOME | End: 2019-10-15
Attending: INTERNAL MEDICINE

## 2021-05-15 VITALS — HEIGHT: 62 IN | WEIGHT: 108 LBS | RESPIRATION RATE: 14 BRPM | BODY MASS INDEX: 19.88 KG/M2

## 2021-05-16 VITALS — RESPIRATION RATE: 16 BRPM | BODY MASS INDEX: 23.74 KG/M2 | WEIGHT: 134 LBS | HEIGHT: 63 IN

## 2021-05-16 VITALS — WEIGHT: 135.5 LBS | BODY MASS INDEX: 24.01 KG/M2 | HEIGHT: 63 IN | RESPIRATION RATE: 14 BRPM

## 2021-05-28 VITALS
HEIGHT: 61 IN | TEMPERATURE: 98.4 F | HEART RATE: 61 BPM | TEMPERATURE: 98 F | OXYGEN SATURATION: 99 % | OXYGEN SATURATION: 99 % | TEMPERATURE: 98.3 F | WEIGHT: 109.57 LBS | WEIGHT: 127.43 LBS | BODY MASS INDEX: 20.77 KG/M2 | RESPIRATION RATE: 16 BRPM | RESPIRATION RATE: 18 BRPM | SYSTOLIC BLOOD PRESSURE: 129 MMHG | DIASTOLIC BLOOD PRESSURE: 88 MMHG | BODY MASS INDEX: 23.45 KG/M2 | BODY MASS INDEX: 20.69 KG/M2 | SYSTOLIC BLOOD PRESSURE: 136 MMHG | DIASTOLIC BLOOD PRESSURE: 62 MMHG | HEIGHT: 61 IN | SYSTOLIC BLOOD PRESSURE: 136 MMHG | SYSTOLIC BLOOD PRESSURE: 146 MMHG | OXYGEN SATURATION: 100 % | TEMPERATURE: 98.7 F | HEIGHT: 62 IN | HEART RATE: 87 BPM | RESPIRATION RATE: 16 BRPM | WEIGHT: 125 LBS | WEIGHT: 110.01 LBS | WEIGHT: 123.68 LBS | HEIGHT: 61 IN | OXYGEN SATURATION: 97 % | BODY MASS INDEX: 20.79 KG/M2 | HEART RATE: 66 BPM | DIASTOLIC BLOOD PRESSURE: 70 MMHG | SYSTOLIC BLOOD PRESSURE: 102 MMHG | WEIGHT: 126.98 LBS | HEART RATE: 62 BPM | DIASTOLIC BLOOD PRESSURE: 75 MMHG | DIASTOLIC BLOOD PRESSURE: 65 MMHG | BODY MASS INDEX: 23.35 KG/M2 | OXYGEN SATURATION: 100 % | TEMPERATURE: 99 F | BODY MASS INDEX: 23.6 KG/M2 | WEIGHT: 108.69 LBS | HEART RATE: 93 BPM | DIASTOLIC BLOOD PRESSURE: 71 MMHG | HEART RATE: 80 BPM | DIASTOLIC BLOOD PRESSURE: 73 MMHG | HEART RATE: 76 BPM | HEIGHT: 61 IN | HEIGHT: 61 IN | OXYGEN SATURATION: 98 % | OXYGEN SATURATION: 100 % | TEMPERATURE: 98.8 F | SYSTOLIC BLOOD PRESSURE: 146 MMHG | SYSTOLIC BLOOD PRESSURE: 126 MMHG | BODY MASS INDEX: 23.98 KG/M2 | TEMPERATURE: 98.1 F

## 2021-05-28 VITALS
DIASTOLIC BLOOD PRESSURE: 79 MMHG | OXYGEN SATURATION: 100 % | OXYGEN SATURATION: 96 % | WEIGHT: 125.66 LBS | DIASTOLIC BLOOD PRESSURE: 57 MMHG | SYSTOLIC BLOOD PRESSURE: 122 MMHG | RESPIRATION RATE: 20 BRPM | SYSTOLIC BLOOD PRESSURE: 147 MMHG | RESPIRATION RATE: 16 BRPM | DIASTOLIC BLOOD PRESSURE: 70 MMHG | DIASTOLIC BLOOD PRESSURE: 69 MMHG | BODY MASS INDEX: 21.23 KG/M2 | HEART RATE: 88 BPM | BODY MASS INDEX: 23.73 KG/M2 | OXYGEN SATURATION: 100 % | TEMPERATURE: 98.6 F | RESPIRATION RATE: 16 BRPM | HEIGHT: 62 IN | WEIGHT: 123.68 LBS | RESPIRATION RATE: 18 BRPM | SYSTOLIC BLOOD PRESSURE: 98 MMHG | RESPIRATION RATE: 18 BRPM | RESPIRATION RATE: 16 BRPM | SYSTOLIC BLOOD PRESSURE: 131 MMHG | TEMPERATURE: 96.8 F | DIASTOLIC BLOOD PRESSURE: 79 MMHG | HEART RATE: 69 BPM | HEIGHT: 61 IN | DIASTOLIC BLOOD PRESSURE: 80 MMHG | SYSTOLIC BLOOD PRESSURE: 149 MMHG | DIASTOLIC BLOOD PRESSURE: 89 MMHG | HEART RATE: 70 BPM | WEIGHT: 125.22 LBS | RESPIRATION RATE: 18 BRPM | SYSTOLIC BLOOD PRESSURE: 113 MMHG | BODY MASS INDEX: 23.95 KG/M2 | TEMPERATURE: 98.5 F | WEIGHT: 101.41 LBS | BODY MASS INDEX: 22.27 KG/M2 | WEIGHT: 105.82 LBS | SYSTOLIC BLOOD PRESSURE: 120 MMHG | WEIGHT: 124.78 LBS | RESPIRATION RATE: 16 BRPM | BODY MASS INDEX: 20.89 KG/M2 | HEIGHT: 61 IN | HEART RATE: 113 BPM | RESPIRATION RATE: 18 BRPM | DIASTOLIC BLOOD PRESSURE: 75 MMHG | OXYGEN SATURATION: 100 % | TEMPERATURE: 98 F | OXYGEN SATURATION: 100 % | OXYGEN SATURATION: 100 % | SYSTOLIC BLOOD PRESSURE: 98 MMHG | HEIGHT: 61 IN | OXYGEN SATURATION: 98 % | WEIGHT: 112.43 LBS | HEART RATE: 70 BPM | HEART RATE: 69 BPM | WEIGHT: 115.96 LBS | WEIGHT: 127.21 LBS | HEART RATE: 60 BPM | HEART RATE: 64 BPM | OXYGEN SATURATION: 98 % | TEMPERATURE: 97.5 F | HEIGHT: 61 IN | RESPIRATION RATE: 14 BRPM | OXYGEN SATURATION: 98 % | OXYGEN SATURATION: 97 % | BODY MASS INDEX: 23.56 KG/M2 | HEIGHT: 61 IN | HEIGHT: 61 IN | HEART RATE: 93 BPM | WEIGHT: 129.41 LBS | HEART RATE: 90 BPM | SYSTOLIC BLOOD PRESSURE: 147 MMHG | HEIGHT: 61 IN | WEIGHT: 110.67 LBS | BODY MASS INDEX: 18.55 KG/M2 | BODY MASS INDEX: 21.89 KG/M2 | OXYGEN SATURATION: 100 % | TEMPERATURE: 98.7 F | HEART RATE: 84 BPM | HEART RATE: 67 BPM | BODY MASS INDEX: 23.64 KG/M2 | TEMPERATURE: 98 F | OXYGEN SATURATION: 100 % | TEMPERATURE: 97.5 F | SYSTOLIC BLOOD PRESSURE: 127 MMHG | HEIGHT: 61 IN | TEMPERATURE: 98.1 F | TEMPERATURE: 97.6 F | DIASTOLIC BLOOD PRESSURE: 79 MMHG | BODY MASS INDEX: 23.81 KG/M2 | BODY MASS INDEX: 23.35 KG/M2 | RESPIRATION RATE: 20 BRPM | HEART RATE: 69 BPM | BODY MASS INDEX: 24.02 KG/M2 | WEIGHT: 117.95 LBS | RESPIRATION RATE: 16 BRPM | TEMPERATURE: 98.4 F | RESPIRATION RATE: 18 BRPM | SYSTOLIC BLOOD PRESSURE: 109 MMHG | TEMPERATURE: 96.4 F | TEMPERATURE: 99.1 F | DIASTOLIC BLOOD PRESSURE: 63 MMHG | DIASTOLIC BLOOD PRESSURE: 62 MMHG | HEIGHT: 61 IN | HEIGHT: 61 IN | SYSTOLIC BLOOD PRESSURE: 144 MMHG | BODY MASS INDEX: 19.35 KG/M2 | DIASTOLIC BLOOD PRESSURE: 81 MMHG

## 2021-05-28 VITALS
SYSTOLIC BLOOD PRESSURE: 140 MMHG | TEMPERATURE: 99 F | BODY MASS INDEX: 19.9 KG/M2 | HEIGHT: 61 IN | HEART RATE: 60 BPM | WEIGHT: 105.38 LBS | OXYGEN SATURATION: 100 % | DIASTOLIC BLOOD PRESSURE: 69 MMHG

## 2021-05-28 NOTE — PROGRESS NOTES
"Patient: DARINEL PEDROZA     Acct: [id number]     Report: #[id number]  UNIT #: [id number]     : 1960    Encounter Date:2018  PRIMARY CARE: MAIK VILLA  ***Signed***  --------------------------------------------------------------------------------------------------------------------  Visit Type      Established Patient Visit            Referring Provider/Copies To      Referring Provider:  Cameron Lin            Allergies      Coded Allergies:             NO KNOWN ALLERGIES (Unverified , 18)            Medications      Last Reconciled on 18 13:48 by NANCY KNOX      Hydrocodone/Acetaminophen 5/325 MG (Hydrocodone/Acetaminophen 5/325 MG) 1 Each     Tablet      1 TAB PO Q4H PRN for PAIN, TAB         Reported         18       Prochlorperazine Maleate (Prochlorperazine Maleate) 10 Mg Tab      10 MG PO Q6H PRN for NAUSEA AND/OR VOMITING, #60 TAB 5 Refills         Reported         18       Ondansetron HCl (Zofran) 8 Mg Tablet      8 MG PO Q8H PRN for NAUSEA UNRELIEVED BY ZOFRAN for 30 Days, #90 TAB 5 Refills         Reported         18      Medications Reviewed:  No Changes made to meds            History and Present Illness      Past Oncology Illness History      Mrs. Pedroza is a very pleasant 57-yr-old female who presented with complaints of    food becoming \"clogged\" in her lower esophagus.  Indicates liquids usually do     not feel that way except with eating foods.  She has experienced approximately     20lbs wt loss over the past 2-3mo.  She was referred to Dr. Lin and     EGD/colonoscopy were completed 18.  The esophagus was found to have a     fungating mass of malignant appearance in the gastroesophageal junction.  The     stomach appeared normal; however biopsies taken to eval for gastritis. The     duodenum was normal.  Colonoscopy revealed normal colon.  Unfortunately the     biopsy of the esophageal mass was found to be well-differentiated   "   adenocarcinoma.  The stomach biopsies were positive for H. pylori.  She was     prescribed treatment for H. pylori.  A PET/CT completed prior to the EGD,     7/27/18, found thickening of the distal esophagus at the gastroesophageal samantha    ction.  This area was found to be hypermetabolic with a maximum SUV of 9.3.      Findings would be most compatible with esophageal carcinoma.  There was also a     hypermetabolic metastatic lymph node in the right peritracheal space measuring     1.3 cm in size with maximum SUV of 9.5.  No abnormal hypermetabolic activity     noted elsewhere within the neck, chest, abdomen, or pelvis.            [facility] - Oncology Interim      Patient returns today for ongoing treatment of her esophageal cancer. She is     ready for week 2 of chemotherapy conjunction with radiation. She states that her    first weekly treatment went quite well. She had no side effects or problems from    either chemotherapy or radiation. She denies numbness or tingling in the hands     or feet. She continues to work full-time and her energy remains good. She     continues to have some difficulty with swallowing particular she tries to eat     larger amounts or more solid foods. She does well with liquids. Her weight is     essentially stable. She reports normal bladder and bowel habits.            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            Clinical Staging      Unable to completely stage at this time; at least Stage II d/t positive node     metastasis            Clinical Trial Participant      No            ECOG Performance Status      0            Most Recent Lab Findings            9/6/18 09:42            PAST, FAMILY   Past Medical History      Hematology/oncology:  REPORTS HX OF: Skin cancer            Past Surgical History      REPORTS HX OF: Skin cancer removal            Social History      Lives independently:  Yes      Occupation:               Tobacco Use      Tobacco status:  Former smoker            Alcohol Use      Alcohol intake:  None            Substance Use      Substance use:  Denies use            VITAL SIGNS,PAIN/FATIGUE SCORE      Vitals      Height 5 ft 0.98 in / 154.9 cm      Weight 125 lbs 10.595 oz / 57 kg      BSA 1.58 m2      BMI 23.8 kg/m2      Temperature 97.5 F / 36.39 C - Temporal      Pulse 64      Respirations 20      Blood Pressure 109/63 Sitting      Pulse Oximetry 98%, rm air            General Appearance:  Alert, Oriented X3, Cooperative, No acute distress      Eyes:  Anicteric Sclerae, Moist Conjunctiva      Neck:  Supple, No Masses or JVD      Respiratory:  CTAB, Normal Respiratory Effort      Abdomen\Gastro:  Soft, No NABS; No Masses, No Hepatosplenomegaly      Cardio:  RRR, No Murmur, No, Peripheral Edema      Psychiatric:  Appropriate Affect, Intact Judgement      Extremities:  No Digital Cyanosis (upper extremities), No Digital Ischemia     (upper extremities)      Lymphatic:  No Cervical, No Supraclavicular, No Infraclavicular            PREVENTION      Hx Influenza Vaccination:  Yes (2017)      Date Influenza Vaccine Given:  Oct 1, 2017      Influenza Vaccine Declined:  No      2 or More Falls Past Year?:  No      Fall Past Year with Injury?:  No      Hx Pneumococcal Vaccination:  No      Encouraged to follow-up with:  PCP regarding preventative exams.      Chart initiated by      luis manuel otero ma            IMPRESSION/PLAN      Impression      Esophageal cancer, node positive. Receiving combined modality therapy with XRT a    nd weekly carboplatin/Taxol            Diagnosis      Esophageal cancer         Malignant neoplasm of lower third of esophagus         Malignant neoplasm of esophagus location: lower third            Notes      Patient is doing quite well with her combined modality therapy. She is due for     week 2. Lab work looks good. Proceed with treatment as planned. Performance     status remains  excellent. I encouraged her to continue to push her nutrition and    fluid intake. She meets the dietitian regularly. Continue XRT as per radiation     oncology. I will see her back in one week for ongoing treatment labs prior.            Patient Education      Patient Education Provided:  Yes                 Disclaimer: Converted document may not contain table formatting or lab diagrams. Please see [employer] for the authenticated document.

## 2021-05-28 NOTE — PROGRESS NOTES
"Patient: DARINEL PEDROZA     Acct: [id number]     Report: #[id number]  UNIT #: [id number]     : 1960    Encounter Date:2018  PRIMARY CARE: MAIK VILLA  ***Signed***  --------------------------------------------------------------------------------------------------------------------  Visit Type      Established Patient Visit            Chief Complaint      ESOPHAGEAL CANCER      Intent of Therapy:  Curative            Referring Provider/Copies To      Referring Provider:  Cameron Lin            Allergies      Coded Allergies:             NO KNOWN ALLERGIES (Unverified , 18)            Medications      Last Reconciled on 18 11:17 by MICHELLE BUCIO      Lidocaine (Xylocaine 2% Viscous) 100 Ml Solution      10 ML PO AC for 30 Days, #200 ML 3 Refills         Prov: MAURIZIO BANSAL         18       Sucralfate (Carafate) 1 Gm Tab      1 GM PO ACHS, #120 TAB 0 Refills         Prov: MAURIZIO BANSAL         18       Hydrocodone/Acetaminophen 5/325 MG (Hydrocodone/Acetaminophen 5/325 MG) 1 Each     Tablet      1 TAB PO Q4H PRN for PAIN, TAB         Reported         18       Prochlorperazine Maleate (Prochlorperazine Maleate) 10 Mg Tab      10 MG PO Q6H PRN for NAUSEA AND/OR VOMITING, #60 TAB 5 Refills         Reported         18       Ondansetron HCl (Zofran) 8 Mg Tablet      8 MG PO Q8H PRN for NAUSEA UNRELIEVED BY ZOFRAN for 30 Days, #90 TAB 5 Refills         Reported         18      Medications Reviewed:  Changes made to meds            History and Present Illness      Past Oncology Illness History      Mrs. Pedroza is a very pleasant 57-yr-old female who presented with complaints of    food becoming \"clogged\" in her lower esophagus.  Indicates liquids usually do     not feel that way except with eating foods.  She has experienced approximately     20lbs wt loss over the past 2-3mo.  She was referred to Dr. Lin and     EGD/colonoscopy were completed 18.  The " esophagus was found to have a     fungating mass of malignant appearance in the gastroesophageal junction.  The     stomach appeared normal; however biopsies taken to eval for gastritis. The duo    denum was normal.  Colonoscopy revealed normal colon.  Unfortunately the biopsy     of the esophageal mass was found to be well-differentiated adenocarcinoma.  The     stomach biopsies were positive for H. pylori.  She was prescribed treatment for     H. pylori.  A PET/CT completed prior to the EGD, 7/27/18, found thickening of     the distal esophagus at the gastroesophageal junction.  This area was found to     be hypermetabolic with a maximum SUV of 9.3.  Findings would be most compatible     with esophageal carcinoma.  There was also a hypermetabolic metastatic lymph     node in the right peritracheal space measuring 1.3 cm in size with maximum SUV     of 9.5.  No abnormal hypermetabolic activity noted elsewhere within the neck,     chest, abdomen, or pelvis.            HPI - Oncology Interim      Returns to clinic for assessment and C3 Cisplatin--bp slightly low 98/70-sl     low--reports having difficulty swallowing-feels a lump at back of throat and     burning at stomach.  Denies n/v--having issues with constipation--encouraged to     drink more water.  Discussed feeding tube placement.  She has s/w dietician a     couple of times.  Has lost a couple of lbs since last week/treatment.  Reports     rash/bumps to mid-lower back--itches; however does not hurt.  Applying lotion to    area and it seems to help.  Reports minimal fatigue-still working and going to     [employer].  Denies fever, lymphadenopathy or overt pain.            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            Clinical Staging      Unable to completely stage at this time; at least Stage II d/t positive node     metastasis            Treatments      Chemotherapy      Cisplatin initiated 8/30/18       Radiation Therapy      Radiation initiated            Clinical Trial Participant      No            ECOG Performance Status      0            Most Recent Lab Findings      Laboratory Tests      9/6/18 09:42            PAST, FAMILY   Past Medical History      Hematology/oncology:  REPORTS HX OF: Skin cancer            Past Surgical History      REPORTS HX OF: Skin cancer removal            Social History      Lives independently:  Yes      Occupation:              Tobacco Use      Tobacco status:  Former smoker            Alcohol Use      Alcohol intake:  None            Substance Use      Substance use:  Denies use            REVIEW OF SYSTEMS      General:  Complains of: Fatigue; Denies: Appetite change, Excessive sweating,     Fever, Night sweats, Weight gain, Weight loss, Other      Eyes:  Denies: Blurred vision, Corrective lenses, Diplopia, Eye irritation, Eye     pain, Eye redness, Spots in vision, Vision loss, Other      Ears, nose, mouth, throat:  Denies: Headache, Seizures, Visual Changes, Hearing     loss, Sinus Congestion, Hoarseness, Sore throat, Other      Cardiovascular:  Denies: Chest pain, Irregular heartbeat, Palpitations, Swollen     ankles/legs, Other      Respiratory:  Denies: Chest pain, Shortness of Air, Productive cough, Coughing     blood, Other      Gastrointestinal:  Denies: Nausea, Vomiting, Problem swallowing, Frequent     heartburn, Constipation, Diarrhea, Tarry stools, Bloody stools, Unable to     control bowels, Other      Kidney/Bladder:  Denies: Painful Urination, Change in urinary stream, Blood in     urine, Incontinence, Frequent Urination, Decreased urine stream, Other      Musculoskeletal:  Denies: New Back pain, Leg Cramps, Painful Joints, Swollen J    oints, Muscle Pain, Muscle weakness, Other      Skin:  DENIES: Jaundice, Easy Bleeding, Lesions/changes in moles, Nail changes,     Skin Discoloration, Rash, Other      Neurological:  Denies: Dizziness, Fainting,  Numbness\Tingling, Paralysis,     Seizures, Other      Psychiatric:  Complains of: AAO X 3; Denies: Anxiety, Panic attacks, Depression,    Memory loss, Other      Endocrine:  DiabetesThyroid DisorderOsteoporosisEndocrine Other      Hematologic/lymphatic:  Denies: Bruising, Bleeding, Enlarged Lymph Nodes,     Recurrent infections, Other      Reproductive:  Denies Pregnant, Denies Menopause, Denies Still Menstruating,     Denies Heavy Periods, Denies Other            VITAL SIGNS,PAIN/FATIGUE SCORE      Vitals      Height 5 ft 0.98 in / 154.9 cm      Weight 125 lbs 3.540 oz / 56.8 kg      BSA 1.57 m2      BMI 23.7 kg/m2      Temperature 96.4 F / 35.78 C - Temporal      Pulse 84      Respirations 16      Blood Pressure 98/70 Sitting, Left Arm      Pulse Oximetry 100%, ROOM AIR            Pain Score      Experiencing any pain?:  No      Pain Scale Used:  Numerical      Pain Intensity:  0            Fatigue Score      Experiencing any fatigue?:  Yes      Fatigue (0-10 scale):  4            EXAM      General Appearance:  Alert, Oriented X3, Cooperative, No acute distress      Eyes:  Anicteric Sclerae, Moist Conjunctiva      Neck:  Supple, No Masses or JVD      Respiratory:  CTAB, Normal Respiratory Effort      Chest:  Port in Place      Abdomen\Gastro:  Soft, No NABS; No Masses, No Hepatosplenomegaly      Cardio:  RRR, No Murmur, No, Peripheral Edema      Psychiatric:  Appropriate Affect, Intact Judgement      Extremities:  No Digital Cyanosis, No Digital Ischemia      Lymphatic:  No Cervical, No Supraclavicular, No Infraclavicular, No Axillary            PREVENTION      Hx Influenza Vaccination:  Yes      Date Influenza Vaccine Given:  Oct 1, 2017      Influenza Vaccine Declined:  No      2 or More Falls Past Year?:  No      Fall Past Year with Injury?:  No      Hx Pneumococcal Vaccination:  No      Encouraged to follow-up with:  PCP regarding preventative exams.      Chart initiated by      ALVINO PATEL CMA             IMPRESSION/PLAN      Impression      Esophageal cancer, node positive. Receiving combined modality therapy with XRT     and weekly carboplatin/Taxol            Diagnosis      Esophageal cancer         Malignant neoplasm of lower third of esophagus - C15.5         Malignant neoplasm of esophagus location: lower third      Patient presents for week 3 of combined modality therapy. She is tolerating her     chemotherapy quite well. She continues to have difficulty with swallowing but is    eating and drinking to the best of her ability. Her weight is maintained. Her     energy level is good-she continues to work full-time. Lab work today looks good.    She is having increasing esophagitis from her radiation and has started using     Magic mouthwash. I will add Carafate to help with her symptoms. Continue XRT as     per radiation oncology. Follow-up in one week for ongoing treatment.            Notes      New Medications      * Sucralfate (Carafate) 1 GM TAB: 1 GM PO ACHS #120      * Lidocaine (Xylocaine 2% Viscous) 100 ML SOLUTION: 10 ML PO AC 30 Days #200         Instructions: Use with meals or snacks as needed//not limited to 3 times        daily            Patient Education      Patient Education Provided:  Yes                 Disclaimer: Converted document may not contain table formatting or lab diagrams. Please see [employer] for the authenticated document.

## 2021-05-28 NOTE — PROGRESS NOTES
"Patient: DARINEL GAITAN     Acct: [id number]     Report: #[id number]  UNIT #: [id number]     : 1960    Encounter Date:2019  PRIMARY CARE: MAIK VILLA  ***Signed***  --------------------------------------------------------------------------------------------------------------------  NURSE INTAKE      Visit Type      Established Patient Visit            Chief Complaint      TX PLAN      Intent of Therapy:  Palliative            Referring Provider/Copies To      Referring Provider:  Cameron Lin      Primary Care Provider:  MAIK VILLA            History and Present Illness      Past Oncology Illness History      Mrs. Gaitan is a very pleasant 57-yr-old female who presented with complaints of    food becoming \"clogged\" in her lower esophagus.  Indicates liquids usually do     not feel that way except with eating foods.  She has experienced approximately     20lbs wt loss over the past 2-3mo.  She was referred to Dr. Lin and     EGD/colonoscopy were completed 18.  The esophagus was found to have a     fungating mass of malignant appearance in the gastroesophageal junction.  The     stomach appeared normal; however biopsies taken to eval for gastritis. The     duodenum was normal.  Colonoscopy revealed normal colon.  Unfortunately the     biopsy of the esophageal mass was found to be well-differentiated     adenocarcinoma.  The stomach biopsies were positive for H. pylori.  She was     prescribed treatment for H. pylori.  A PET/CT completed prior to the EGD,     18, found thickening of the distal esophagus at the gastroesophageal     junction.  This area was found to be hypermetabolic with a maximum SUV of 9.3.      Findings would be most compatible with esophageal carcinoma.  There was also a     hypermetabolic metastatic lymph node in the right peritracheal space measuring     1.3 cm in size with maximum SUV of 9.5.  No abnormal hypermetabolic activity not    ed elsewhere within the " neck, chest, abdomen, or pelvis.            HPI - Oncology Interim      F/u to discuss txp-now with metastatic dx.  Wt down to 106lbs-she said she is     eating; however, evidently not as much.  She occasional trouble swallowing but     generally not.  She remains very active.  ECOG PS 0.  Denies pain; some     discomfort in mid-back; however, not chronic.  No distress noted.            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            Metastatic Sites      Lung            Clinical Staging      Stage II (sD3N2R1)          Metastatic (8/2019)            Treatments      Chemotherapy      9/27/18 completed 5wkly neoadjuvant Carboplatin/Taxol treatments (with     concurrent RT)            adjuvant CapeOX-poor tolerance-dc 3/7/19      Radiation Therapy      8/30/08- 10/2/18 completed 4140 cGy=23fxs RT to lower esophagus            Clinical Trial Participant      No            ECOG Performance Status      0            Most Recent Imaging Findings      8/19/19            PROCEDURE:   PET CT SKULL BASE TO MID THIGH SUBSEQ             COMPARISON:   [facility], PET, SKULL BASE TO MID THIGH SUBSEQ,     10/22/2018, 9:07.             INDICATIONS:   C16.0             TECHNIQUE:   After obtaining the patient's consent, F-18 FDG was administered     intravenously.  PET/CT       imaging was performed from skull to thigh with multi-planar imaging without oral    or intravenous       contrast material, using a dedicated integrated PET/CT scanner.               RADIONUCLIDE:     12.34 MCI   F18 FDG- I.V.      LABS:                          Blood Glucose 94 mg/dl             FINDINGS:         HEAD/NECK:   There is new bulky confluent hypermetabolic adenopathy at the base     of the left neck and       left supraclavicular region measuring 6.2 cm x 2.3 cm and 2.5 cm x 2 cm.  SUV     maximum is 8.3.      THORAX:   There is new hypermetabolic adenopathy in right paratracheal  region,     AP window and tear or       decreased in measuring up to 3.5 cm x 2 cm.  SUV maximum is 7.8.  There is a 1.2    cm hypermetabolic       pulmonary nodule in the right lower lobe adjacent to the major fissure.  There     is a new 6 mm       hypermetabolic nodule posteriorly in the left lower lobe.  There is a 1.7 cm     hypermetabolic left       retrocrural node.        ABDOMEN:   Normal.  No pathologic FDG activity.        PELVIS:   Normal.  No pathologic FDG activity.        BONES:   New inferior endplate compression at L4 without evidence of abnormal     hypermetabolic       activity.  No pathologic FDG activity.  No suspicious lesions on CT imaging.        OTHER:   Negative.               CONCLUSION:   New metastatic disease as above.            PAST, FAMILY   Past Medical History      Hematology/Oncology (F):  GI Cancer (esophageal ca), Skin Cancer            Past Surgical History      Biopsy (esophagus,NECK), Skin Cancer Removal, VAD Placement            Family History      Family History:  Breast Cancer (paternal aunt), Colorectal Cancer (sister)            Social History      Marital Status:        Lives independently:  Yes      Number of Children:  2      Occupation:              Tobacco Use      Tobacco status:  Former smoker            Alcohol Use      Alcohol intake:  None            Substance Use      Substance use:  Denies use            REVIEW OF SYSTEMS      General:  Admits: Fatigue, Weight Loss      Eye:  Denies Corrective Lenses      ENT:  Denies Headache      Cardiovascular:  Denies Chest Pain      Gastrointestinal:  Admits Nausea/Vomiting      Musculoskeletal:  Admits Back Pain            VITAL SIGNS AND SCORES      Vitals      Weight 105 lbs 13.133 oz / 48 kg      Temperature 98.7 F / 37.06 C - Temporal      Pulse 93      Respirations 16      Blood Pressure 144/79 Sitting, Left Arm      Pulse Oximetry 100%, RM AIR            Pain Score      Pain Scale Used:   Numerical      Pain Intensity:  0            Fatigue Score      Fatigue (0-10 scale):  5            EXAM      General Appearance:  Positive for: Alert, Oriented x3, Cooperative;          Negative for: Acute Distress      Eye:  Positive for: Anicteric Sclerae, Moist Conjunctiva      Neck:  Positive for: Supple;          Negative for: JVD, Masses      Other      Healing left supraclavicular incision      Respiratory:  Positive for: CTAB, Normal Respiratory Effort      Chest:  Port in Place      Abdomen/Gastro:  Positive for: Normal Active Bowel Sounds, Soft;          Negative for: Distention, Hepatosplenomegaly, Tenderness      Cardiovascular:  Positive for: RRR;          Negative for: Gallop, Murmur, Peripheral Edema, Rub      Psychiatric:  Positive for: Appropriate Affect, Intact Judgement      Lymphatic:  Negative for: Cervical, Infraclavicular, Supraclavicular            PREVENTION      Hx Influenza Vaccination:  Yes      Date Influenza Vaccine Given:  Oct 1, 2018      Influenza Vaccine Declined:  No      2 or More Falls Past Year?:  No      Fall Past Year with Injury?:  No      Hx Pneumococcal Vaccination:  No      Encouraged to follow-up with:  PCP regarding preventative exams.      Chart initiated by      ENEIDA VELAZCO MA            ALLERGY/MEDS      Allergies      Coded Allergies:             NO KNOWN ALLERGIES (Unverified , 9/12/19)            Medications      Last Reconciled on 9/12/19 10:59 by MICHELLE BUCIO      Hydrocodone/Acetaminophen 5/325 MG (Hydrocodone/Acetaminophen 5/325 MG) 1 Each     Tablet      1-2 TAB PO Q4H for Post Surgical PAin, #10 TAB 0 Refills         Prov: Ion Riely         9/4/19      Medications Reviewed:  No Changes made to meds            IMPRESSION/PLAN      Diagnosis      Metastasis from esophageal cancer - C79.9, C15.9      Patient has biopsy-proven recurrent/metastatic esophageal cancer.  She continues    to have a good performance status, ECOG PS 0.  I will obtain  "MRI of the brain to    ensure no metastatic foci there.  We discussed that this is unfortunate not     curable but can be treated to offer palliation of symptoms as well as     prolongation survival.  We discussed standard chemotherapies versus clinical     trial versus \"precision medicine\".  I have requested next generation sequencing     testing on her metastatic focus that was excised recently.  This may offer     additional treatment options.  She is not interested in traveling for clinical     trial at this point.  With regard to standard chemotherapy, she has an excellent    performance status and no underlying medical conditions that would make it     difficult to tolerate chemotherapy.  I would recommend the modified DCF regimen     consisting of docetaxel, carboplatin, 5-fluorouracil with Neulasta support.      Side effects, risk and benefits discussed in detail with patient and family.      Written teaching information provided.  She is not sure what direction she would    like to go with regard to treatment.  I suggested out of care evaluation to help    with goals of care to which she is agreeable.  I will plan to see her back in a     couple weeks to finalize decision making.      New Diagnostics      * Brain W/WO Cont MRI, As Soon As Possible      * CBC With Auto Diff, Routine      * CMP Comp Metabolic Panel, Routine      New Referrals      * Paliative/Supportive Care, Stat            Abnormal CT scan, lumbar spine - R93.7      Patient will have CT of the lumbar spine to assess the area identified on PET     scan      New Diagnostics      * L-Spine W/Wo C MRI, As Soon As Possible            Patient Education            Carboplatin Injection      Docetaxel Injection      Fluorouracil Injection      Palliative Care for Cancer      Patient Education Provided:  Yes                 Disclaimer: Converted document may not contain table formatting or lab diagrams. Please see [employer] " for the authenticated document.

## 2021-05-28 NOTE — PROGRESS NOTES
"Patient: DARINEL PEDROZA     Acct: [id number]     Report: #[id number]  UNIT #: [id number]     : 1960    Encounter Date:2018  PRIMARY CARE: MAIK VILLA  ***Signed***  --------------------------------------------------------------------------------------------------------------------  Visit Type      Established Patient Visit            Chief Complaint      ESOPHAGEAL CANCER            Referring Provider/Copies To      Referring Provider:  Cameorn Lin            Allergies      Coded Allergies:             NO KNOWN ALLERGIES (Unverified , 18)            Medications      Last Reconciled on 18 10:55 by MICHELLE BUCIO      Lidocaine (Xylocaine 2% Viscous) 100 Ml Solution      10 ML PO AC for 30 Days, #200 ML 3 Refills         Prov: MAURIZIO BANSAL         18       Sucralfate (Carafate) 1 Gm Tab      1 GM PO ACHS, #120 TAB 0 Refills         Prov: MAURIZIO BANSAL         18       Hydrocodone/Acetaminophen 5/325 MG (Hydrocodone/Acetaminophen 5/325 MG) 1 Each     Tablet      1 TAB PO Q4H PRN for PAIN, TAB         Reported         18       Prochlorperazine Maleate (Prochlorperazine Maleate) 10 Mg Tab      10 MG PO Q6H PRN for NAUSEA AND/OR VOMITING, #60 TAB 5 Refills         Reported         18       Ondansetron HCl (Zofran) 8 Mg Tablet      8 MG PO Q8H PRN for NAUSEA UNRELIEVED BY ZOFRAN for 30 Days, #90 TAB 5 Refills         Reported         18            History and Present Illness      Past Oncology Illness History      Mrs. Pedroza is a very pleasant 57-yr-old female who presented with complaints of    food becoming \"clogged\" in her lower esophagus.  Indicates liquids usually do     not feel that way except with eating foods.  She has experienced approximately     20lbs wt loss over the past 2-3mo.  She was referred to Dr. Lin and     EGD/colonoscopy were completed 18.  The esophagus was found to have a     fungating mass of malignant appearance in the " "gastroesophageal junction.  The     stomach appeared normal; however biopsies taken to eval for gastritis. The     duodenum was normal.  Colonoscopy revealed normal colon.  Unfortunately the     biopsy of the esophageal mass was found to be well-differentiated     adenocarcinoma.  The stomach biopsies were positive for H. pylori.  She was     prescribed treatment for H. pylori.  A PET/CT completed prior to the EGD,     7/27/18, found thickening of the distal esophagus at the gastroesophageal     junction.  This area was found to be hypermetabolic with a maximum SUV of 9.3.      Findings would be most compatible with esophageal carcinoma.  There was also a     hypermetabolic metastatic lymph node in the right peritracheal space measuring     1.3 cm in size with maximum SUV of 9.5.  No abnormal hypermetabolic activity     noted elsewhere within the neck, chest, abdomen, or pelvis.            HPI - Oncology Interim      Patient presents today for week 5 of carboplatin and Taxol in conjunction with     radiation. She states that she has tolerated both modalities very well. She     continues to feel a \"lump\" in her lower esophagus which is unchanged from     previous. She is still able to eat and drink adequately. She has lost a pound or    so since her last visit but she is still able to perform all of her ADLs     including working full-time. She denies nausea or vomiting. She reports some     fatigue which lasts over the weekend after each chemotherapy cycle but again she    still able to do her normal daily routines. She denies numbness or tingling in     the hands or feet. No new masses or lymphadenopathy. No unusual aches or pains.            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            Clinical Staging      Unable to completely stage at this time; at least Stage II d/t positive node     metastasis            Clinical Trial Participant      No            ECOG " Performance Status      0            Most Recent Lab Findings      Laboratory Tests      9/20/18 09:28            PAST, FAMILY   Past Medical History      Hematology/oncology:  REPORTS HX OF: Skin cancer            Past Surgical History      REPORTS HX OF: Skin cancer removal            Social History      Lives independently:  Yes      Occupation:              Tobacco Use      Tobacco status:  Former smoker            Alcohol Use      Alcohol intake:  None            Substance Use      Substance use:  Denies use            REVIEW OF SYSTEMS      General:  Denies: Appetite change, Excessive sweating, Fatigue, Fever, Night     sweats, Weight gain, Weight loss, Other      Eyes:  Denies: Blurred vision, Corrective lenses, Diplopia, Eye irritation, Eye     pain, Eye redness, Spots in vision, Vision loss, Other      Ears, nose, mouth, throat:  Denies: Headache, Seizures, Visual Changes, Hearing     loss, Sinus Congestion, Hoarseness, Sore throat, Other      Cardiovascular:  Denies: Chest pain, Irregular heartbeat, Palpitations, Swollen     ankles/legs, Other      Respiratory:  Denies: Chest pain, Shortness of Air, Productive cough, Coughing     blood, Other      Gastrointestinal:  Denies: Nausea, Vomiting, Problem swallowing, Frequent     heartburn, Constipation, Diarrhea, Tarry stools, Bloody stools, Unable to     control bowels, Other      Kidney/Bladder:  Denies: Painful Urination, Change in urinary stream, Blood in     urine, Incontinence, Frequent Urination, Decreased urine stream, Other      Musculoskeletal:  Denies: New Back pain, Leg Cramps, Painful Joints, Swollen     Joints, Muscle Pain, Muscle weakness, Other      Skin:  DENIES: Jaundice, Easy Bleeding, Lesions/changes in moles, Nail changes,     Skin Discoloration, Rash, Other      Neurological:  Denies: Dizziness, Fainting, Numbness\Tingling, Paralysis, Seizu    res, Other      Psychiatric:  Complains of: AAO X 3; Denies: Anxiety, Panic attacks,  Depression,    Memory loss, Other      Endocrine:  DiabetesThyroid DisorderOsteoporosisEndocrine Other      Hematologic/lymphatic:  Denies: Bruising, Bleeding, Enlarged Lymph Nodes,     Recurrent infections, Other      Reproductive:  Denies Pregnant, Denies Menopause, Denies Still Menstruating,     Denies Heavy Periods, Denies Other            VITAL SIGNS,PAIN/FATIGUE SCORE      Vitals      Height 5 ft 0.63 in / 154 cm      Temperature 96.8 F / 36 C - Temporal      Pulse 60      Respirations 18      Blood Pressure 113/57 Sitting, Left Arm      Pulse Oximetry 97%, rm air            Pain Score      Experiencing any pain?:  No      Pain Scale Used:  Numerical            Fatigue Score      Experiencing any fatigue?:  No      Fatigue (0-10 scale):  0 (none)            EXAM      General Appearance:  Alert, Oriented X3, Cooperative, No acute distress      Eyes:  Anicteric Sclerae, Moist Conjunctiva      Neck:  Supple, No Masses or JVD      Respiratory:  CTAB, Normal Respiratory Effort      Chest:  Port in Place      Abdomen:  Bowel Sounds, Soft;          No Distention, No Guarding, No Hepatosplenomegaly, No Tenderness      Cardio:  RRR, No Murmur, No, Peripheral Edema      Psychiatric:  Appropriate Affect, Intact Judgement      Extremities:  No Digital Cyanosis (upper extremities), No Digital Ischemia     (upper extremities)      Lymphatic:  No Axillary, No Cervical, No Infraclavicular, No Supraclavicular            PREVENTION      Hx Influenza Vaccination:  Yes      Date Influenza Vaccine Given:  Oct 1, 2017      Influenza Vaccine Declined:  No      2 or More Falls Past Year?:  No      Fall Past Year with Injury?:  No      Hx Pneumococcal Vaccination:  No      Encouraged to follow-up with:  PCP regarding preventative exams.      Chart initiated by      Nuvia Quintanilla cma            IMPRESSION/PLAN      Impression      Esophageal cancer, node positive. undergoing neoadjuvant concurrent chemotherapy    RT             Diagnosis      Esophageal cancer         Malignant neoplasm of lower third of esophagus - C15.5         Malignant neoplasm of esophagus location: lower third      Patient is ready for her fifth and final cycle of weekly carboplatin and Taxol     in conjunction with radiation. She is tolerating both modalities quite well.     Proceed with chemotherapy today as planned. Continue XRT as per radiation     oncology. She are he has surgical evaluation and PET scan scheduled for part    ially one month. I will see her back at the same visit with lab work to ensure     that all acute toxicities are resolving.            Chemotherapy-induced thrombocytopenia - D69.59, T45.1X5A      Hemoglobin has trended down slightly but still asymptomatic. No need for     transfusion or dose adjustment. Recheck next visit            Antineoplastic chemotherapy induced anemia - D64.81, T45.1X5A      Platelet count has decreased secondary to treatment but asymmetric dramatic.     Repeat next visit            Patient Education      Patient Education Provided:  Yes                 Disclaimer: Converted document may not contain table formatting or lab diagrams. Please see [employer] for the authenticated document.

## 2021-05-28 NOTE — PROGRESS NOTES
"Patient: DARINEL PEDROZA     Acct: [id number]     Report: #[id number]  UNIT #: [id number]     : 1960    Encounter Date:2018  PRIMARY CARE: MAIK VILLA  ***Signed***  --------------------------------------------------------------------------------------------------------------------  Visit Type      Established Patient Visit            Chief Complaint      ESOPHAGEAL CA      Intent of Therapy:  Curative            Referring Provider/Copies To      Referring Provider:  Cameron Lin            Allergies      Coded Allergies:             NO KNOWN ALLERGIES (Unverified , 18)            Medications      Last Reconciled on 18 10:55 by MICHELLE BUCIO      Lidocaine (Xylocaine 2% Viscous) 100 Ml Solution      10 ML PO AC for 30 Days, #200 ML 3 Refills         Prov: MAURIZIO BANSAL         18       Sucralfate (Carafate) 1 Gm Tab      1 GM PO ACHS, #120 TAB 0 Refills         Prov: MAURIZIO BANSAL         18       Hydrocodone/Acetaminophen 5/325 MG (Hydrocodone/Acetaminophen 5/325 MG) 1 Each     Tablet      1 TAB PO Q4H PRN for PAIN, TAB         Reported         18       Prochlorperazine Maleate (Prochlorperazine Maleate) 10 Mg Tab      10 MG PO Q6H PRN for NAUSEA AND/OR VOMITING, #60 TAB 5 Refills         Reported         18       Ondansetron HCl (Zofran) 8 Mg Tablet      8 MG PO Q8H PRN for NAUSEA UNRELIEVED BY ZOFRAN for 30 Days, #90 TAB 5 Refills         Reported         18      Medications Reviewed:  No Changes made to meds            History and Present Illness      Past Oncology Illness History      Mrs. Pedroza is a very pleasant 57-yr-old female who presented with complaints of    food becoming \"clogged\" in her lower esophagus.  Indicates liquids usually do     not feel that way except with eating foods.  She has experienced approximately     20lbs wt loss over the past 2-3mo.  She was referred to Dr. Lin and     EGD/colonoscopy were completed 18.  The " esophagus was found to have a     fungating mass of malignant appearance in the gastroesophageal junction.  The     stomach appeared normal; however biopsies taken to eval for gastritis. The     duodenum was normal.  Colonoscopy revealed normal colon.  Unfortunately the     biopsy of the esophageal mass was found to be well-differentiated     adenocarcinoma.  The stomach biopsies were positive for H. pylori.  She was     prescribed treatment for H. pylori.  A PET/CT completed prior to the EGD,     7/27/18, found thickening of the distal esophagus at the gastroesophageal     junction.  This area was found to be hypermetabolic with a maximum SUV of 9.3.      Findings would be most compatible with esophageal carcinoma.  There was also a     hypermetabolic metastatic lymph node in the right peritracheal space measuring     1.3 cm in size with maximum SUV of 9.5.  No abnormal hypermetabolic activity     noted elsewhere within the neck, chest, abdomen, or pelvis.            HPI - Oncology Interim      Presents today for C4 Cisplatin. Has approximately 8 RT left. Viscous lidocaine     as needed helped with swallowing.  Weight is basically stable.  She is drinking     boost/ensure and trying to drink plenty of fluids; however, reports drinking     fluids is harder than eating food.  She indicates she is managing her fatigue     well, as she continues to work FT.  She has not need to utilize anti-emetic yet;    however has it if needed.  Denies fever, lymphadenopathy, new masses, cough or     SOA at this time. 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            Clinical Staging      Unable to completely stage at this time; at least Stage II d/t positive node     metastasis            Clinical Trial Participant      No            ECOG Performance Status      0            Most Recent Lab Findings      Laboratory Tests       9/13/18 09:42            PAST, FAMILY   Past Medical History      Hematology/oncology:  REPORTS HX OF: Skin cancer            Past Surgical History      REPORTS HX OF: Skin cancer removal            Social History      Lives independently:  Yes      Occupation:              Tobacco Use      Tobacco status:  Former smoker            Alcohol Use      Alcohol intake:  None            Substance Use      Substance use:  Denies use            REVIEW OF SYSTEMS      General:  Complains of: Fatigue; Denies: Appetite change, Excessive sweating,     Fever, Night sweats, Weight gain, Weight loss, Other      Eyes:  Denies: Blurred vision, Corrective lenses, Diplopia, Eye irritation, Eye     pain, Eye redness, Spots in vision, Vision loss, Other      Ears, nose, mouth, throat:  Denies: Headache, Seizures, Visual Changes, Hearing     loss, Sinus Congestion, Hoarseness, Sore throat, Other      Cardiovascular:  Denies: Chest pain, Irregular heartbeat, Palpitations, Swollen     ankles/legs, Other      Respiratory:  Denies: Chest pain, Shortness of Air, Productive cough, Coughing     blood, Other      Gastrointestinal:  Denies: Nausea, Vomiting, Problem swallowing, Frequent     heartburn, Constipation, Diarrhea, Tarry stools, Bloody stools, Unable to     control bowels, Other      Kidney/Bladder:  Denies: Painful Urination, Change in urinary stream, Blood in     urine, Incontinence, Frequent Urination, Decreased urine stream, Other      Musculoskeletal:  Denies: New Back pain, Leg Cramps, Painful Joints, Swollen     Joints, Muscle Pain, Muscle weakness, Other      Skin:  DENIES: Jaundice, Easy Bleeding, Lesions/changes in moles, Nail changes,     Skin Discoloration, Rash, Other      Neurological:  Denies: Dizziness, Fainting, Numbness\Tingling, Paralysis,     Seizures, Other      Psychiatric:  Complains of: AAO X 3; Denies: Anxiety, Panic attacks, Depression,    Memory loss, Other      Endocrine:  DiabetesThyroid  DisorderOsteoporosisEndocrine Other      Hematologic/lymphatic:  Denies: Bruising, Bleeding, Enlarged Lymph Nodes,     Recurrent infections, Other      Reproductive:  Denies Pregnant, Denies Menopause, Denies Still Menstruating,     Denies Heavy Periods, Denies Other            VITAL SIGNS,PAIN/FATIGUE SCORE      Vitals      Height 5 ft 0.98 in / 154.9 cm      Weight 123 lbs 10.849 oz / 56.1 kg      BSA 1.56 m2      BMI 23.4 kg/m2      Temperature 98 F / 36.67 C - Temporal      Pulse 69      Respirations 14      Blood Pressure 98/62 Sitting      Pulse Oximetry 100%, RM AAIR            Pain Score      Experiencing any pain?:  No            Fatigue Score      Experiencing any fatigue?:  Yes      Fatigue (0-10 scale):  6            EXAM      General Appearance:  Alert, Oriented X3, Cooperative, No acute distress      Eyes:  Anicteric Sclerae, Moist Conjunctiva      Neck:  Supple, No Masses or JVD      Respiratory:  CTAB, Normal Respiratory Effort      Chest:  Port in Place      Abdomen:  Bowel Sounds, Soft;          No Distention, No Fluid Wave, No Guarding, No Hepatosplenomegaly, No Rebound,    No Tenderness      Cardio:  RRR, No Murmur, No, Peripheral Edema      Psychiatric:  Appropriate Affect, Intact Judgement      Lymphatic:  No Axillary, No Cervical, No Infraclavicular, No Supraclavicular            PREVENTION      Hx Influenza Vaccination:  Yes      Date Influenza Vaccine Given:  Oct 1, 2017      Influenza Vaccine Declined:  No      2 or More Falls Past Year?:  No      Fall Past Year with Injury?:  No      Hx Pneumococcal Vaccination:  No      Encouraged to follow-up with:  PCP regarding preventative exams.      Chart initiated by      ENEIDA WILLIAM            IMPRESSION/PLAN      Impression      Esophageal cancer, node positive. Receiving combined modality therapy with XRT     and weekly carboplatin/Taxol            Diagnosis      Esophageal cancer         Malignant neoplasm of lower third of esophagus -  C15.5         Malignant neoplasm of esophagus location: lower third      Patient is due for her next weekly cycle of carboplatin plus Taxol. Tolerating     quite well, she continues to work full-time. She reports one instance some mild     nausea but has not required antiemetics. She continues to work on nutrition and     fluid intake. She has lost approximately 1 pound since last week but has been     working diligently with the dietitian to maintain her weight. Lab work today     looks good with the exception of mild anemia. No need for dose adjustment her     transfusion. Proceed with treatment as planned. RTC 1 week for OV, weekly cycle     5 of labs prior. Continue XRT as per radiation oncology.            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     Recheck next visit.            Patient Education      Patient Education Provided:  Yes                 Disclaimer: Converted document may not contain table formatting or lab diagrams. Please see [employer] for the authenticated document.

## 2021-05-28 NOTE — PROGRESS NOTES
"Patient: DARINEL GAITAN     Acct: [id number]     Report: #[id number]  UNIT #: [id number]     : 1960    Encounter Date:2019  PRIMARY CARE: MAIK VILLA  ***Signed***  --------------------------------------------------------------------------------------------------------------------  NURSE INTAKE      Visit Type      Established Patient Visit            Chief Complaint      ESOPHAGEAL CANCER      Intent of Therapy:  Curative            Referring Provider/Copies To      Referring Provider:  Cameron Lin            History and Present Illness      Past Oncology Illness History      Mrs. Gaitan is a very pleasant 57-yr-old female who presented with complaints of    food becoming \"clogged\" in her lower esophagus.  Indicates liquids usually do     not feel that way except with eating foods.  She has experienced approximately     20lbs wt loss over the past 2-3mo.  She was referred to Dr. Lin and     EGD/colonoscopy were completed 18.  The esophagus was found to have a     fungating mass of malignant appearance in the gastroesophageal junction.  The     stomach appeared normal; however biopsies taken to eval for gastritis. The     duodenum was normal.  Colonoscopy revealed normal colon.  Unfortunately the     biopsy of the esophageal mass was found to be well-differentiated     adenocarcinoma.  The stomach biopsies were positive for H. pylori.  She was     prescribed treatment for H. pylori.  A PET/CT completed prior to the EGD,     18, found thickening of the distal esophagus at the gastroesophageal juncti    on.  This area was found to be hypermetabolic with a maximum SUV of 9.3.      Findings would be most compatible with esophageal carcinoma.  There was also a     hypermetabolic metastatic lymph node in the right peritracheal space measuring     1.3 cm in size with maximum SUV of 9.5.  No abnormal hypermetabolic activity     noted elsewhere within the neck, chest, abdomen, or pelvis.    "         HPI - Oncology Interim      Patient presents today for cycle 2 of adjuvant Cape ox for her esophageal     cancer.  She had a lot of issues with cycle 1 with nausea, vomiting, sore mouth     and significant neuropathy which lasted 8 days after her cycle.  She states that    all of the symptoms are now much better.  The neuropathy has resolved.  She is     eating and drinking normally, her weight is maintained.  She denies any     swallowing issues.  She is interested in going back to work.  At her last visit,    we discussed dose reduction and attempts at further adjuvant treatment.  She     feels that she is ready to try cycle 2 today.            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            Clinical Staging      StageII ([id number])            Treatments      Chemotherapy      9/27/18 completed 5wkly neoadjuvant Carboplatin/Taxol treatments (with     concurrent RT)            adjuvant CapeOX      Radiation Therapy      8/30/08- 10/2/18 completed 4140 cGy=23fxs RT to lower esophagus            Clinical Trial Participant      No            ECOG Performance Status      0            Most Recent Lab Findings      Laboratory Tests      2/14/19 09:30            Laboratory Tests            Test       2/14/19      09:30             Magnesium Level       2.00 mg/dL      (1.60-2.30)            PAST, FAMILY   Past Medical History      Other PMH      NONE      Hematology/Oncology (F):  GI Cancer (esophageal ca), Skin Cancer            Past Surgical History      Biopsy (esophagus), Skin Cancer Removal, VAD Placement            Family History      Family History:  Breast Cancer (paternal aunt), Colorectal Cancer (sister)            Social History      Marital Status:        Lives independently:  Yes      Number of Children:  2      Occupation:              Tobacco Use      Tobacco status:  Former smoker            Alcohol Use      Alcohol intake:  None             Substance Use      Substance use:  Denies use            REVIEW OF SYSTEMS      General:  Denies: Appetite Change, Fatigue, Fever, Night Sweats, Weight Gain,     Weight Loss      Eye:  Denies: Blurred Vision, Corrective Lenses, Diplopia, Eye Irritation, Eye     Pain, Eye Redness, Spots in Vision, Vision Loss      ENT:  Denies: Headache, Hearing Loss, Hoarseness, Seizures, Sinus Congestion,     Sore Throat      Cardiovascular:  Denies: Chest Pain, Edema Ankles, Edema Legs, Irregular     Heartbeat, Palpitations      Respiratory:  Denies: Coughing Blood, Productive Cough, Shortness of Air,     Wheezing      Gastrointestinal:  Denies: Bloody Stools, Constipation, Diarrhea, Frequent     Heartburn, Nausea, Problem Swallowing, Tarry Stools, Unable to Control Bowels,     Vomiting      Genitourinary (female):  Denies: Blood in Urine, Decrease Urine Stream, Frequent    Urination, Incontinence, Painful Urination      Musculoskeletal:  Denies: Back Pain, Leg Cramps, Muscle Pain, Muscle Weakness,     Painful Joints, Swollen Joints      Integumentary:  Denies: Bleeds Easily, Bruises Easily, Hair Changes, Jaundice,     Lesions, Mole Changes, Nail Changes, Pigment Changes, Rash, Skin Discoloration      Neurologic:  Denies: Dizziness, Fainting, Numbness\Tingling, Paralysis, Seizures      Psychiatric:  Denies: Anxiety, Confused, Depression, Disoriented, Memory Loss      Endocrine:  Denies: Cold Intolerance, Diabetes, Excessive Sweating, Excessive     Thirst, Excessive Urination, Heat Intolerance, Flushing, Hyperthyroidism,     Hypothyroidism      Hematologic/Lymphatic:  Denies: Bruising, Bleeding, Enlarged Lymph Nodes,     Recurrent Infections, Transfusions            VITAL SIGNS AND SCORES      Vitals      Height 5 ft 0.63 in / 154 cm      Weight 117 lbs 15.138 oz / 53.5 kg      BSA 1.50 m2      BMI 22.6 kg/m2      Temperature 97.6 F / 36.44 C - Temporal      Pulse 70      Respirations 18      Blood Pressure 120/79 Sitting,  Left Arm      Pulse Oximetry 96%, ROOM AIR            Pain Score      Experiencing any pain?:  No      Pain Scale Used:  Numerical      Pain Intensity:  0            Fatigue Score      Experiencing any fatigue?:  No      Fatigue (0-10 scale):  0 (none)            EXAM      General Appearance:  Positive for: Alert, Oriented x3, Cooperative;          Negative for: Acute Distress      Neck:  Positive for: Supple;          Negative for: JVD, Masses      Respiratory:  Positive for: CTAB, Normal Respiratory Effort      Chest:  Port in Place      Abdomen/Gastro:  Positive for: Normal Active Bowel Sounds, Soft;          Negative for: Distention, Hepatosplenomegaly, Tenderness      Cardiovascular:  Positive for: RRR;          Negative for: Gallop, Murmur, Peripheral Edema, Rub      Psychiatric:  Positive for: Appropriate Affect, Intact Judgement            PREVENTION      Date Influenza Vaccine Given:  Oct 1, 2018      Influenza Vaccine Declined:  No      2 or More Falls Past Year?:  No      Fall Past Year with Injury?:  No      Encouraged to follow-up with:  PCP regarding preventative exams.      Chart initiated by      ALVINO PATEL CMA            ALLERGY/MEDS      Allergies      Coded Allergies:             NO KNOWN ALLERGIES (Unverified , 2/14/19)            Medications      Last Reconciled on 2/5/19 13:58 by NANCY KNOX      Prochlorperazine Maleate (Prochlorperazine Maleate) 10 Mg Tab      10 MG PO Q6H PRN for NAUSEA AND/OR VOMITING, #60 TAB         Reported         1/24/19       Ondansetron HCl (Zofran) 8 Mg Tablet      8 MG PO Q8H PRN for NAUSEA, TAB 0 Refills         Reported         1/24/19       Capecitabine (Xeloda) 500 Mg Tab      500 MG PO BID for 28 Days, #168 TAB 4 Refills         Prov: MAURIZIO BANSAL         1/15/19       Metoprolol Succ/HCTZ 50/25 Mg (Metoprolol Hctz 50/25 Mg) 1 Each Tablet      1 TAB PO QDAY, TAB         Reported         1/8/19      Medications Reviewed:  No Changes made to meds             IMPRESSION/PLAN      Impression      Esophageal cancer            Diagnosis      Esophageal cancer         Malignant neoplasm of lower third of esophagus - C15.5         Malignant neoplasm of esophagus location: lower third      Patient is status post neoadjuvant chemo RT followed by resection.  She is now     on adjuvant chemotherapy.  She is due for cycle 2 of Cape ox.  She had a lot of     issues with cycle 1 which have now resolved.  Because of those problems, she     will have dose reduction.  Her lab work today is adequate and I will proceed     with cycle 2 as planned with the dose reductions.  She is released to return to     work per her request.  I will see her back in 3 weeks time for cycle 3 with labs    prior.            Patient Education      Patient Education Provided:  Yes                 Disclaimer: Converted document may not contain table formatting or lab diagrams. Please see [employer] [facility] for the authenticated document.

## 2021-05-28 NOTE — PROGRESS NOTES
Patient: DARINEL PEDROZA     Acct: [id number]     Report: #[id number]  UNIT #: [id number]     : 1960    Encounter Date:2019  PRIMARY CARE: MAIK VILLA  ***Signed***  --------------------------------------------------------------------------------------------------------------------  Encounter Date      2019      ESOPHAGEAL CANCER            History of Present Illness      This is a 58-year-old female who presents today for postoperative follow-up     after undergoing an minimally invasive [place] Nathan esophagectomy on 2018.  She had uneventful hospital stay and was discharged home she subsequently    was readmitted from  -  for a right lower lobe presumed     aspiration pneumonia from which she recovered at that time she developed a lower    extremity rash and biopsy showed Henoch-SchÃ¶nlein purpura.  Since we last saw     her she has been doing quite well she has been off tube feeds for approximately     2 weeks and maintaining her weight she is tolerating a soft diet without any     dysphasia nausea or emesis.  She denies pain.  She did present to her primary     care physician for evaluation of redness around her J-tube site and was given     antibiotics for this.            Procedure: Bronchoscopy, EGD, minimally invasive Jeff[place] Nathan esophagectomy,     laparoscopic J-tube placement on 2018            Allergies      Coded Allergies:             NO KNOWN ALLERGIES (Unverified , 19)            Yes: Bowel Surgery (COLONOSCOPY), Oral Surgery (WISDOM TEETH REMOVED)      Smoking status:  Former smoker      Alcohol intake:  None      Medical History:  Yes: Chemotherapy/Cancer (SKIN CANCER, ESOPHAGEAL CANCER- NEED    PAC ACCESS), Chronic Bronchitis/COPD (MILD - DOESN'T USE INHALERS ),     Hemorrhoids/Rectal Prob (DYSPHAGIA, REFLUX, NAUSEA, HERNIA), Shortness Of Breath    (TB POSITVE WITH EVERY TEST NOT ACTIVE); No: Arthritis, Asthma, Blood Disease,      Congestive Heart Failu, Deafness or Ringing Ears, Diabetes, Seizures, High Blood    Pressure, Night sweats, Miscellaneous Medical/oth            Medications      Last Reconciled on 1/8/19 13:54 by ALAN AUGUSTE,       Metoprolol Succ/HCTZ 50/25 Mg (Metoprolol Hctz 50/25 Mg) 1 Each Tablet      1 TAB PO QDAY, TAB         Reported         1/8/19            Vitals      Height 5 ft 0.63 in / 154 cm      Weight 125 lbs 0.013 oz / 56.7 kg      BSA 1.54 m2      BMI 23.9 kg/m2      Temperature 98.7 F / 37.06 C - Temporal      Pulse 61      Blood Pressure 136/70 Sitting, Right Arm      Pulse Oximetry 98%, ROOM AIR            General Appearance:  Alert, Oriented X3      HEENT:  Orophraynx clear, No Erythema, No Exudates      Neck:  Supple, No Masses or JVD      Respiratory:  CTAB, Other (Incisions well-healed without erythema or exudate)      Abdomen:  Soft, No NABS, No Masses, No Hernias, No Hepatosplenomegaly (Incisions    well-healed without erythema or exudate, J-tube with localized erythematous     reaction at suture sites no induration or fluctuance)      Cardiovascular:  No Chest Tenderness; Regular Rate and Rhythm      Lymphatic:  No Neck      Extremeties:  Pulses Positive all 4 Ext      Neurological:  Mental Status WNL      Musculoskeletal:  Normal Bulk Strength      Skin:  No Rash, No Cellulitis      Psychiatric:  Appropriate Affect            Imaging/Interpretation      Chest x-ray shows no active acute cardiopulmonary disease there is improved     aeration of the right lower lobe there is blunting of the right costophrenic     angle that is stable.            Pathology      YpT3 N0 M0 esophageal adenocarcinoma            Notes      New Medications      * Metoprolol Succ/HCTZ 50/25 Mg (Metoprolol Hctz 50/25 Mg) 1 EACH TABLET: 1 TAB       PO QDAY      New Diagnostics      * CT Abd/Pelvis/Chest W/Contrast, SCHEDULED PROCEDURE         Dx: Esophageal cancer - C15.9      Ms. Gaitan is doing well and we are pleased  with her progress.  She is     tolerating a soft diet without difficulty and her tube feeds have been off for     approximately 2 weeks and she is maintaining her weight at this point time we     have removed her jejunostomy tube.  She may advance her diet as tolerated with     focus on small frequent meals and chewing well.  She was also counseled on     lifestyle changes associated with esophagectomy including sleeping with the head    of her bed elevated as well as cessation of all oral intake approximately 4     hours before bedtime.  We will plan for her to see Dr. Soto for consideration     of adjuvant therapy given the final pathologic stage of her tumor.  We will plan    to see her in 3 months with a CT of the chest abdomen and pelvis with oral and     IV contrast for continued surveillance.  As always she was instructed to call     with any questions or concerns            PREVENTION      Hx Influenza Vaccination:  Yes      Date Influenza Vaccine Given:  Oct 1, 2018      Influenza Vaccine Declined:  No      2 or More Falls Past Year?:  No      Fall Past Year with Injury?:  No      Hx Pneumococcal Vaccination:  No      Encouraged to follow-up with:  PCP regarding preventative exams.      Chart initiated by      ALVINO PATEL CMA                 Disclaimer: Converted document may not contain table formatting or lab diagrams. Please see "[employer] System for the authenticated document.

## 2021-05-28 NOTE — PROGRESS NOTES
Patient: DARINEL PEDROZA     Acct: [id number]     Report: #[id number]  UNIT #: [id number]     : 1960    Encounter Date:2019  PRIMARY CARE: MAIK VILLA  ***Signed***  --------------------------------------------------------------------------------------------------------------------  Encounter Date      Aug 27, 2019      ESOPHAGEAL CANCER            History of Present Illness      This is a 58-year-old female who presents to the thoracic surgical clinic today     for continued follow-up after undergoing an esophagectomy in 2018.  She    did attempt to undergo adjuvant chemotherapy but was not able to tolerate this     even at a reduced dose as such she discontinued adjuvant therapy.  Since we last    saw her she is overall doing well and states that she has no trouble with eating    or drinking.  She did undergo PET CT to elucidate the abnormal findings on her     CT scan. She specifically denies dysphagia, odynophagia, gastroesophageal reflux    disease, nausea, emesis.  She also denies fever, chills, shortness of breath,     dyspnea on exertion.  Of note in the postoperative period she was readmitted for    a rash on her lower extremities that underwent biopsy and was found to be     Henoch-SchÃ¶nlein purpura            Procedure: Bronchoscopy, EGD, minimally invasive Iver Nathan esophagectomy, lap     scopic J-tube insertion 2018            Allergies      Coded Allergies:             NO KNOWN ALLERGIES (Unverified , 19)            Fatigue            Yes: Bowel Surgery (COLONOSCOPY), Oral Surgery (WISDOM TEETH REMOVED)      Smoking status:  Former smoker      Alcohol intake:  None      Medical History:  Yes: Chemotherapy/Cancer (SKIN CANCER, ESOPHAGEAL CANCER- NEED    PAC ACCESS), Chronic Bronchitis/COPD (MILD - DOESN'T USE INHALERS ),     Hemorrhoids/Rectal Prob (DYSPHAGIA, REFLUX, NAUSEA, HERNIA), Shortness Of Breath    (TB POSITVE WITH EVERY TEST NOT ACTIVE); No: Arthritis,  Asthma, Blood Disease,     Congestive Heart Failu, Deafness or Ringing Ears, Diabetes, Seizures, High Blood    Pressure, Night sweats, Miscellaneous Medical/oth            Medications      Last Reconciled on 8/27/19 20:58 by ALAN AUGUSTE DO      Acitretin (Soriatane) 25 Mg Cap      25 MG PO QDAY, CAP         Reported         8/13/19       Prochlorperazine Maleate (Prochlorperazine Maleate) 10 Mg Tab      10 MG PO Q6H PRN for NAUSEA AND/OR VOMITING, #60 TAB         Reported         1/24/19       Ondansetron Hcl (ONDANSETRON HCL) 8 Mg Tablet      8 MG PO Q8H PRN for NAUSEA, TAB 0 Refills         Reported         1/24/19            Vitals      Weight 108 lbs 10.988 oz / 49.3 kg      Temperature 98.4 F / 36.89 C - Temporal      Pulse 80      Respirations 18      Blood Pressure 146/71 Sitting, Left Arm      Pulse Oximetry 100%, rm air            General Appearance:  Alert, Oriented X3      HEENT:  Orophraynx clear, No Erythema, No Exudates (d)      Neck:  Supple, No Masses or JVD      Respiratory:  CTAB      Abdomen:  Soft, No NABS, No Masses, No Hernias, No Hepatosplenomegaly (incisions    well healed)      Cardiovascular:  No Chest Tenderness; Regular Rate and Rhythm      Lymphatic:  Neck (fullness over the left cervical and supraclavicular lymph     nodes)      Extremeties:  Pulses Positive all 4 Ext      Neurological:  Mental Status WNL      Skin:  No Rash, No Cellulitis      Psychiatric:  Appropriate Affect            Imaging/Interpretation      I personally reviewed the PET CT: HEAD/NECK:   There is new bulky confluent hyp    ermetabolic adenopathy at the base of the left neck and       left supraclavicular region measuring 6.2 cm x 2.3 cm and 2.5 cm x 2 cm.  SUV     maximum is 8.3.      THORAX:   There is new hypermetabolic adenopathy in right paratracheal region,     AP window and tear or       decreased in measuring up to 3.5 cm x 2 cm.  SUV maximum is 7.8.  There is a 1.2    cm hypermetabolic        pulmonary nodule in the right lower lobe adjacent to the major fissure.  There     is a new 6 mm       hypermetabolic nodule posteriorly in the left lower lobe.  There is a 1.7 cm     hypermetabolic left       retrocrural node.        ABDOMEN:   Normal.  No pathologic FDG activity.        PELVIS:   Normal.  No pathologic FDG activity.        BONES:   New inferior endplate compression at L4 without evidence of abnormal     hypermetabolic       activity.  No pathologic FDG activity.  No suspicious lesions on CT imaging.        OTHER:   Negative.            This is a 59 yo female who presents today for the results of her most recent PET    CT.  She has been discussed at multidisciplinary tumor board and the findings     are concerning for recurrence.  We will plan for a left supraclavicular lymph     node biopsy to prove recurrence.  She will follow up with Dr. Soto regarding     these results and the initiation of therapy.  We will coordinate surveillence     imaging with the medical oncology team and see her in follow up at that time.            PREVENTION      Hx Influenza Vaccination:  Yes      Date Influenza Vaccine Given:  Oct 1, 2018      Influenza Vaccine Declined:  No      2 or More Falls Past Year?:  No      Fall Past Year with Injury?:  No      Hx Pneumococcal Vaccination:  No      Encouraged to follow-up with:  PCP regarding preventative exams.      Chart initiated by      luis manuel cunningham ma                 Disclaimer: Converted document may not contain table formatting or lab diagrams. Please see [employer] for the authenticated document.

## 2021-05-28 NOTE — PROGRESS NOTES
"Patient: DARINEL GAITAN     Acct: [id number]     Report: #[id number]  UNIT #: [id number]     : 1960    Encounter Date:2019  PRIMARY CARE: MAIK VILLA  ***Signed***  --------------------------------------------------------------------------------------------------------------------  NURSE INTAKE      Visit Type      Established Patient Visit            Chief Complaint      RESULTS FROM MRI      Intent of Therapy:  Palliative            Referring Provider/Copies To      Referring Provider:  Cameron Lin      Primary Care Provider:  MAIK VILLA            History and Present Illness      Past Oncology Illness History      Mrs. Gaitan is a very pleasant 57-yr-old female who presented with complaints of    food becoming \"clogged\" in her lower esophagus.  Indicates liquids usually do no    t feel that way except with eating foods.  She has experienced approximately     20lbs wt loss over the past 2-3mo.  She was referred to Dr. Lin and     EGD/colonoscopy were completed 18.  The esophagus was found to have a     fungating mass of malignant appearance in the gastroesophageal junction.  The     stomach appeared normal; however biopsies taken to eval for gastritis. The     duodenum was normal.  Colonoscopy revealed normal colon.  Unfortunately the     biopsy of the esophageal mass was found to be well-differentiated     adenocarcinoma.  The stomach biopsies were positive for H. pylori.  She was     prescribed treatment for H. pylori.  A PET/CT completed prior to the EGD,     18, found thickening of the distal esophagus at the gastroesophageal     junction.  This area was found to be hypermetabolic with a maximum SUV of 9.3.      Findings would be most compatible with esophageal carcinoma.  There was also a     hypermetabolic metastatic lymph node in the right peritracheal space measuring     1.3 cm in size with maximum SUV of 9.5.  No abnormal hypermetabolic activity     noted elsewhere " within the neck, chest, abdomen, or pelvis.            HPI - Oncology Interim      F/u met esophageal ca--she reports appetite has disappeared.  She feels like her    chest hurts at times or is it bc she knows the lymph nodes are enlarged.  She     denies SOA or distress.  She has an appt for second opinion-to pacify her     daughter.  She is undecided on what to do.  She would like to cont to work;     however, does not think treatment and work is feasible.patient states that she     has not been sleeping very well.  She also is depressed especially since     learning of her recurrent disease.  Denies acute pain or distress at this time.            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            Metastatic Sites      Lung            Clinical Staging      Stage II (aJ4C6T3)          Metastatic (8/2019)            Treatments      Chemotherapy      9/27/18 completed 5wkly neoadjuvant Carboplatin/Taxol treatments (with     concurrent RT)            adjuvant CapeOX-poor tolerance-dc 3/7/19      Radiation Therapy      8/30/08- 10/2/18 completed 4140 cGy=23fxs RT to lower esophagus            Clinical Trial Participant      No            ECOG Performance Status      0            Most Recent Lab Findings      Laboratory Tests      9/12/19 11:30            Most Recent Imaging Findings      9/23/19            PROCEDURE:   MRI BRAIN WITHOUT AND WITH CONTRAST             COMPARISON:   None.             INDICATIONS:   FOLLOW UP PET SCAN, ESOPHAGEAL CANCER. LABS DRAWN 9/12/19, CREA.     0.9, GFR>60.             CONTRAST:   10ML  Multihance I.V.             TECHNIQUE:   A variety of imaging planes and parameters were utilized for     visualization of suspected       pathology.  Images were performed without and with gadolinium contrast.             FINDINGS:         There is no acute infarction, acute intracranial hemorrhage, or extra-axial     collection. The       ventricles are  normal in caliber, with no evidence of mass effect or midline     shift. The basal       cisterns are patent. No pathological parenchymal enhancement or mass is     identified.  There are       patchy areas of periventricular and subcortical white matter FLAIR     hyperintensities.             The midline structures are intact. The globes and orbits are unremarkable. The     intra-cranial       vascular flow voids appear patent.             CONCLUSION:         1. Negative for metastasis.      2. No acute intracranial process identified.      3. Patchy areas of bilateral cerebral white matter FLAIR hyperintensities,     likely representing       components of chronic small vessel ischemic disease and post-treatment changes.             PROCEDURE:   MRI LUMBAR SPINE WITH AND WITHOUT INTRAVENOUS CONTRAST             COMPARISON:   [facility], CT, CT CHEST ABD PEL W CONTRAST,    8/02/2019, 9:00.             INDICATIONS:   FOLLOW UP ESOPHAGEAL CANCER. LABS DRAWN 9/12/19, CREA. 0.9,     GFR>60.             CONTRAST:   10ML  Multihance I.V.             TECHNIQUE:   A comprehensive examination was performed utilizing a variety of     imaging planes and       imaging parameters to optimize visualization of suspected pathology.  Images     were performed before       and after the administration of intravenous gadolinium contrast.             FINDINGS:         There is a mild compression fracture of the L4 vertebral body along the inferior    endplate.  This is       unchanged from CT scan of 8/2/2019.  No other compression fractures are     identified.  There is       normal signal within the other visualized bony structures.  Spinal cord     terminates at the T12       level.             LUMBAR DISC LEVELS      L1-L2:   No significant disc/facet abnormality, spinal stenosis, or foraminal     stenosis.        L2-L3:   Mild circumferential disc bulge and mild degenerative facet change.      There is no  spinal       canal stenosis or neural foraminal narrowing.      L3-L4:   Mild circumferential disc bulge and mild degenerative facet change but     no spinal canal       stenosis or neural foraminal narrowing.      L4-L5:   Mild circumferential disc bulge and moderate degenerative facet change.     There is mild       spinal canal stenosis and moderate bilateral neural foraminal narrowing.      L5-S1:   No significant disc bulge.  There are mild degenerative facet changes.     There is no spinal       canal stenosis or neural foraminal narrowing.             CONCLUSION:         1. 1. Old L4 compression fracture with less than 50% loss of height.      2. Degenerative disc disease and degenerative facet change at the L4/5 level     resulting in moderate       bilateral neural foraminal narrowing and mild spinal canal stenosis.            PAST, FAMILY   Past Medical History      Other PMH:        ESOPHAGEAL CA      Hematology/Oncology (F):  GI Cancer (esophageal ca), Skin Cancer            Past Surgical History      Biopsy (esophagus,NECK), Skin Cancer Removal, VAD Placement            Family History      Family History:  Breast Cancer (paternal aunt), Colorectal Cancer (sister)            Social History      Marital Status:        Lives independently:  Yes      Number of Children:  2      Occupation:              Tobacco Use      Tobacco status:  Former smoker            Alcohol Use      Alcohol intake:  None            Substance Use      Substance use:  Denies use            REVIEW OF SYSTEMS      General:  Admits: Fatigue, Weight Loss      Eye:  Admits Blurred Vision (FLOATERS); Denies Vision Changes      ENT:  Admits Headache      Cardiovascular:  Denies Chest Pain      Gastrointestinal:  Admits Nausea/Vomiting; Denies Diarrhea      Musculoskeletal:  Admits Back Pain      Integumentary:  Denies Rash            VITAL SIGNS AND SCORES      Vitals      Weight 101 lbs 6.586 oz / 46 kg       Temperature 98.4 F / 36.89 C - Temporal      Pulse 69      Respirations 16      Blood Pressure 127/75 Sitting, Left Arm      Pulse Oximetry 98%, RM AIR            Pain Score      Pain Scale Used:  Numerical      Pain Intensity:  6 (BACK)            Fatigue Score      Fatigue (0-10 scale):  6            EXAM      General Appearance:  Positive for: Alert, Oriented x3, Cooperative;          Negative for: Acute Distress      Eye:  Positive for: Anicteric Sclerae, Moist Conjunctiva      Other      Healing left supraclavicular incision      Neck:  Positive for: Supple;          Negative for: JVD, Masses      Respiratory:  Positive for: CTAB, Normal Respiratory Effort      Chest:  Port in Place      Abdomen/Gastro:  Positive for: Normal Active Bowel Sounds, Soft;          Negative for: Distention, Hepatosplenomegaly, Tenderness      Cardiovascular:  Positive for: RRR;          Negative for: Gallop, Murmur, Peripheral Edema, Rub      Psychiatric:  Positive for: Appropriate Affect, Intact Judgement      Lymphatic:  Positive for: Cervical (Left), Supraclavicular (Left)            PREVENTION      Hx Influenza Vaccination:  Yes      Date Influenza Vaccine Given:  Oct 1, 2018      Influenza Vaccine Declined:  No      2 or More Falls Past Year?:  No      Fall Past Year with Injury?:  No      Hx Pneumococcal Vaccination:  No      Encouraged to follow-up with:  PCP regarding preventative exams.      Chart initiated by      ENEIDA VELAZCO MA            ALLERGY/MEDS      Allergies      Coded Allergies:             NO KNOWN ALLERGIES (Unverified , 9/24/19)            Medications      Last Reconciled on 9/24/19 10:23 by MICHELLE BUCIO      Amoxicillin (Amoxicillin) 500 Mg Capsule      500 MG PO QID for 7 Days, #28 CAP 0 Refills         Prov: MAURIZIO BANSAL         9/24/19       Mirtazapine (Remeron) 15 Mg Tablet      15 MG PO HS for 30 Days, #30 TAB 4 Refills         Prov: MAURIZIO BANSAL         9/24/19      Medications  Reviewed:  Changes made to meds            IMPRESSION/PLAN      Diagnosis      Metastasis from esophageal cancer - C79.9, C15.9            Depression         Depression, unspecified depression type - F32.9         Depression Type: unspecified            Tooth abscess - K04.7            Notes      Patient with biopsy-proven recurrent/metastatic disease.  Preliminary report     shows PD1 negative at 1%, microsatellite instability-proficient.  Next     generation sequencing mutation testing has not yet returned.  We discussed     standard care chemotherapy at her previous appointment.  She is still     considering those options.  She is seeing [facility] for second     opinion tomorrow.  She will let us know her final decision after that     appointment.      With regard to her depression, sleep and decreased appetite, I will start     Remeron 15 mg at bedtime.  I encouraged her to maximize nutrition and fluid     intake to the extent possible.      Patient reports an abscess tooth on the left for which she is taking amoxicillin    from her dentist.  She is about to run out and requests a refill.  I will     provide a one-week refill but I encouraged her to follow-up with her dentist.      New Medications      * Mirtazapine (Remeron) 15 MG TABLET: 15 MG PO HS 30 Days #30      * Amoxicillin 500 MG CAPSULE: 500 MG PO QID 7 Days #28            Patient Education            Mirtazapine      Nutritional Support (General) (Alternative Therapy)      Patient Education Provided:  Yes                 Disclaimer: Converted document may not contain table formatting or lab diagrams. Please see [employer] for the authenticated document.

## 2021-05-28 NOTE — PROGRESS NOTES
"Patient: DARINEL PEDROZA     Acct: [id number]     Report: #[id number]  UNIT #: [id number]     : 1960    Encounter Date:2018  PRIMARY CARE: MAIK VILLA  ***Signed***  --------------------------------------------------------------------------------------------------------------------  Visit Type      Established Patient Visit            Chief Complaint      Intent of Therapy:  Curative            Referring Provider/Copies To      Referring Provider:  Cameron Lin            Allergies      Coded Allergies:             NO KNOWN ALLERGIES (Unverified , 18)            Medications      Last Reconciled on 18 11:24 by MICHELLE BUCIO      Hydrocodone/Acetaminophen 5/325 MG (Hydrocodone/Acetaminophen 5/325 MG) 1 Each     Tablet      1 TAB PO Q4H PRN for PAIN, TAB         Reported         18       Prochlorperazine Maleate (Prochlorperazine Maleate) 10 Mg Tab      10 MG PO Q6H PRN for NAUSEA AND/OR VOMITING, #60 TAB 5 Refills         Reported         18       Ondansetron HCl (Zofran) 8 Mg Tablet      8 MG PO Q8H PRN for NAUSEA UNRELIEVED BY ZOFRAN for 30 Days, #90 TAB 5 Refills         Reported         18      Medications Reviewed:  No Changes made to meds            History and Present Illness      Past Oncology Illness History      Mrs. Pedroza is a very pleasant 57-yr-old female who presented with complaints of    food becoming \"clogged\" in her lower esophagus.  Indicates liquids usually do     not feel that way except with eating foods.  She has experienced approximately     20lbs wt loss over the past 2-3mo.  She was referred to Dr. Lin and     EGD/colonoscopy were completed 18.  The esophagus was found to have a     fungating mass of malignant appearance in the gastroesophageal junction.  The     stomach appeared normal; however biopsies taken to eval for gastritis. The     duodenum was normal.  Colonoscopy revealed normal colon.  Unfortunately the     biopsy of " the esophageal mass was found to be well-differentiated     adenocarcinoma.  The stomach biopsies were positive for H. pylori.  She was     prescribed treatment for H. pylori.  A PET/CT completed prior to the EGD, 7/ 27/18, found thickening of the distal esophagus at the gastroesophageal     junction.  This area was found to be hypermetabolic with a maximum SUV of 9.3.      Findings would be most compatible with esophageal carcinoma.  There was also a     hypermetabolic metastatic lymph node in the right peritracheal space measuring     1.3 cm in size with maximum SUV of 9.5.  No abnormal hypermetabolic activity n    oted elsewhere within the neck, chest, abdomen, or pelvis.            HPI - Oncology Interim      Presents for 1st weekly Carbo/Taxol with radiation.  She was told she would have    23-26 RT fractions--we informed she would get a chemotherapy treatment with each    wk of radiation. She had her initial fraction radiation this morning. Feeling     well.  Asked questions regarding CBD oil; informed will help s/sx related     treatment but will not help cancer.  She informed she is hoping to work until     possibly not feeling like working.  Denies pain, n/v and fatigue at this time.     She met with the dietitian and has been working to maximize her calorie intake.     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            Clinical Staging      Unable to completely stage at this time; at least Stage II d/t positive node     metastasis            Clinical Trial Participant      No            ECOG Performance Status      0            Most Recent Lab Findings      Laboratory Tests      8/30/18 09:45            PAST, FAMILY   Past Medical History      Hematology/oncology:  REPORTS HX OF: Skin cancer            Past Surgical History      REPORTS  HX OF: Skin cancer removal            Social History      Lives independently:  Yes      Occupation:              Tobacco Use      Tobacco status:  Former smoker            Alcohol Use      Alcohol intake:  None            Substance Use      Substance use:  Denies use            VITAL SIGNS,PAIN/FATIGUE SCORE      Vitals      Height 5 ft 0.98 in / 154.9 cm      Weight 124 lbs 12.486 oz / 56.6 kg      BSA 1.57 m2      BMI 23.6 kg/m2      Temperature 98.6 F / 37 C      Pulse 69      Respirations 18      Blood Pressure 122/69      Pulse Oximetry 100%, RA            Pain Score      Experiencing any pain?:  No            Fatigue Score      Experiencing any fatigue?:  No            General Appearance:  Alert, Oriented X3, Cooperative, No acute distress      Eyes:  Anicteric Sclerae, Moist Conjunctiva      Neck:  Supple, No Masses or JVD      Respiratory:  CTAB, Normal Respiratory Effort, Other (left chest Port-A-Cath)      Abdomen\Gastro:  Soft, No NABS; No Masses, No Hepatosplenomegaly      Cardio:  RRR, No Murmur, No, Peripheral Edema      Psychiatric:  Appropriate Affect, Intact Judgement      Muscularskeletal:  Normal Gait and Station      Extremities:  No Digital Cyanosis (upper extremity), No Digital Ischemia (upper     extremity)      Lymphatic:  No Cervical, No Supraclavicular, No Infraclavicular            PREVENTION      Hx Influenza Vaccination:  Yes (2017)      Date Influenza Vaccine Given:  Oct 1, 2017      Influenza Vaccine Declined:  No      Hx Pneumococcal Vaccination:  No      Encouraged to follow-up with:  PCP regarding preventative exams.            IMPRESSION/PLAN      Impression      Adenocarcinoma of the esophagus/GE junction. Node-positive based on PET scan. Sanchez joseph was evaluated in multidisciplinary clinic and felt to be a good candidate     for multimodality therapy including neoadjuvant upfront chemotherapy and     radiation followed by surgical resection.            Diagnosis       Esophageal cancer         Malignant neoplasm of lower third of esophagus - C15.5         Malignant neoplasm of esophagus location: lower third      Patient with node positive esophageal cancer. Good performance status, ECOG PS     0. Patient is ready to begin neoadjuvant chemotherapy today with weekly     carboplatin and Taxol in conjunction with radiation. Recent lab work looks good.    She has been seen by dietitian and will work to maximize calorie and protein     intake. Proceed with cycle 1 chemotherapy today. Continue radiation as per     radiation oncology. We discussed vitamin B6 daily to help medicate neuropathy     from taxanes. RTC 1 week for OV, chemotherapy with labs prior                 Disclaimer: Converted document may not contain table formatting or lab diagrams. Please see [employer] [facility] for the authenticated document.

## 2021-05-28 NOTE — PROGRESS NOTES
"Patient: DARINEL GAITAN     Acct: [id number]     Report: #[id number]  UNIT #: [id number]     : 1960    Encounter Date:2019  PRIMARY CARE: MAIK VILLA  ***Signed***  --------------------------------------------------------------------------------------------------------------------  NURSE INTAKE      Visit Type      Established Patient Visit            Chief Complaint      esophageal cancer      Intent of Therapy:  Curative            Referring Provider/Copies To      Referring Provider:  Cameron Lin            History and Present Illness      Past Oncology Illness History      Mrs. Gaitan is a very pleasant 57-yr-old female who presented with complaints of    food becoming \"clogged\" in her lower esophagus.  Indicates liquids usually do     not feel that way except with eating foods.  She has experienced approximately     20lbs wt loss over the past 2-3mo.  She was referred to Dr. Lin and     EGD/colonoscopy were completed 18.  The esophagus was found to have a     fungating mass of malignant appearance in the gastroesophageal junction.  The     stomach appeared normal; however biopsies taken to eval for gastritis. The     duodenum was normal.  Colonoscopy revealed normal colon.  Unfortunately the     biopsy of the esophageal mass was found to be well-differentiated     adenocarcinoma.  The stomach biopsies were positive for H. pylori.  She was     prescribed treatment for H. pylori.  A PET/CT completed prior to the EGD,     18, found thickening of the distal esophagus at the gastroesophageal juncti    on.  This area was found to be hypermetabolic with a maximum SUV of 9.3.      Findings would be most compatible with esophageal carcinoma.  There was also a     hypermetabolic metastatic lymph node in the right peritracheal space measuring     1.3 cm in size with maximum SUV of 9.5.  No abnormal hypermetabolic activity     noted elsewhere within the neck, chest, abdomen, or pelvis.    "         HPI - Oncology Interim      F/U esophageal ca--currently CAPEOX regimen--significant nausea/vomiting.  Wt is    down several lbs.  She is extremely tired and sore.  No fever noted.  Denies     fever, lymphadenopathy or distress at this time.            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            Clinical Staging      StageII ([id number])            Treatments      Chemotherapy      9/27/18 completed 5wkly neoadjuvant Carboplatin/Taxol treatments (with     concurrent RT)            adjuvant CapeOX-poor tolerance-dc 3/7/19      Radiation Therapy      8/30/08- 10/2/18 completed 4140 cGy=23fxs RT to lower esophagus            Clinical Trial Participant      No            ECOG Performance Status      0            Most Recent Lab Findings      Laboratory Tests      3/6/19 10:24            Laboratory Tests            Test       3/6/19      10:24             Magnesium Level       1.84 mg/dL      (1.60-2.30)            PAST, FAMILY   Past Medical History      Other PMH      no medical history      Hematology/Oncology (F):  GI Cancer (esophageal ca), Skin Cancer            Past Surgical History      Biopsy (esophagus), Skin Cancer Removal, VAD Placement            Family History      Family History:  Breast Cancer (paternal aunt), Colorectal Cancer (sister)            Social History      Marital Status:        Lives independently:  Yes      Number of Children:  2      Occupation:              Tobacco Use      Tobacco status:  Former smoker            Alcohol Use      Alcohol intake:  None            Substance Use      Substance use:  Denies use            REVIEW OF SYSTEMS      General:  Admits: Appetite Change, Fatigue;          Denies: Fever, Night Sweats, Weight Gain, Weight Loss      Eye:  Denies: Blurred Vision, Corrective Lenses, Diplopia, Eye Irritation, Eye     Pain, Eye Redness, Spots in Vision, Vision Loss      ENT:  Denies: Headache,  Hearing Loss, Hoarseness, Seizures, Sinus Congestion,     Sore Throat      Cardiovascular:  Denies: Chest Pain, Edema Ankles, Edema Legs, Irregular     Heartbeat, Palpitations      Respiratory:  Denies: Coughing Blood, Productive Cough, Shortness of Air,     Wheezing      Gastrointestinal:  Admits: Nausea, Vomiting;          Denies: Bloody Stools, Constipation, Diarrhea, Frequent Heartburn, Problem     Swallowing, Tarry Stools, Unable to Control Bowels      Genitourinary (female):  Denies: Blood in Urine, Decrease Urine Stream, Frequent    Urination, Incontinence, Painful Urination      Musculoskeletal:  Denies: Back Pain, Leg Cramps, Muscle Pain, Muscle Weakness,     Painful Joints, Swollen Joints      Integumentary:  Denies: Bleeds Easily, Bruises Easily, Hair Changes, Jaundice,     Lesions, Mole Changes, Nail Changes, Pigment Changes, Rash, Skin Discoloration      Neurologic:  Denies: Dizziness, Fainting, Numbness\Tingling, Paralysis, Seizures      Psychiatric:  Denies: Anxiety, Confused, Depression, Disoriented, Memory Loss      Endocrine:  Denies: Cold Intolerance, Diabetes, Excessive Sweating, Excessive     Thirst, Excessive Urination, Heat Intolerance, Flushing, Hyperthyroidism,     Hypothyroidism      Hematologic/Lymphatic:  Denies: Bruising, Bleeding, Enlarged Lymph Nodes,     Recurrent Infections, Transfusions      Reproductive:  Denies: Pregnant            VITAL SIGNS AND SCORES      Vitals      Height 5 ft 0.63 in / 154 cm      Weight 110 lbs 10.735 oz / 50.2 kg      BSA 1.46 m2      BMI 21.2 kg/m2      Temperature 98.0 F / 36.67 C - Temporal      Pulse 113      Respirations 20      Blood Pressure 149/79 Sitting, Left Arm      Pulse Oximetry 100%, room air            Pain Score      Experiencing any pain?:  Yes      Pain Scale Used:  Numerical      Pain Intensity:  3            Fatigue Score      Experiencing any fatigue?:  Yes      Fatigue (0-10 scale):  8            EXAM      General Appearance:   Positive for: Alert, Oriented x3, Cooperative;          Negative for: Acute Distress      Neck:  Positive for: Supple;          Negative for: JVD, Masses      Respiratory:  Positive for: CTAB, Normal Respiratory Effort      Chest:  Port in Place      Abdomen/Gastro:  Positive for: Normal Active Bowel Sounds, Soft;          Negative for: Distention, Hepatosplenomegaly, Tenderness      Other      Well-healed surgical incision      Cardiovascular:  Positive for: RRR;          Negative for: Gallop, Murmur, Peripheral Edema, Rub      Psychiatric:  Positive for: Appropriate Affect, Intact Judgement      Lower Extremities:  Negative for: Edema            PREVENTION      Hx Influenza Vaccination:  Yes      Date Influenza Vaccine Given:  Oct 1, 2018      Influenza Vaccine Declined:  No      2 or More Falls Past Year?:  No      Fall Past Year with Injury?:  No      Hx Pneumococcal Vaccination:  No      Encouraged to follow-up with:  PCP regarding preventative exams.      Chart initiated by      joe fish cma            ALLERGY/MEDS      Allergies      Coded Allergies:             NO KNOWN ALLERGIES (Unverified , 3/7/19)            Medications      Last Reconciled on 3/7/19 10:42 by MICHELLE BUCIO      Prochlorperazine Maleate (Prochlorperazine Maleate) 10 Mg Tab      10 MG PO Q6H PRN for NAUSEA AND/OR VOMITING, #60 TAB         Reported         1/24/19       Ondansetron Hcl (ONDANSETRON HCL) 8 Mg Tablet      8 MG PO Q8H PRN for NAUSEA, TAB 0 Refills         Reported         1/24/19       Metoprolol Succ/HCTZ 50/25 Mg (Metoprolol Hctz 50/25 Mg) 1 Each Tablet      1 TAB PO QDAY, TAB         Reported         1/8/19      Medications Reviewed:  Changes made to meds            IMPRESSION/PLAN      Impression      Esophageal cancer            Diagnosis      Esophageal cancer         Malignant neoplasm of lower third of esophagus - C15.5         Malignant neoplasm of esophagus location: lower third      Status post  treatment as above including neoadjuvant chemo RT followed by     resection.  She is now on adjuvant therapy.  Despite dose reduction, she is     still tolerating her treatment quite poorly.  It is impacting her quality of     life greatly.  After discussion, adjuvant therapy will be stopped and she will     begin surveillance.  She will need port flush monthly.  She already has     restaging scans scheduled via surgery.            Dehydration - E86.0      Patient has had increasing difficulties with oral intake, nausea and vomiting.      Her BUN/creatinine ratio would suggest prerenal state.  She will receive 1 L     normal saline with IV Zofran today.  Push oral intake at home.            Hypokalemia - E87.6      Likely related to her GI symptoms.  Mildly decreased.  We discussed potassium     rich foods.  Recheck next visit.            Notes      Discontinued Medications      * Capecitabine (Xeloda) 500 MG TAB: 500 MG PO BID 28 Days #168         Instructions: 3tabs (1500mg) twice daily            Patient Education            Hydration Solution for Injection      Patient Education Provided:  Yes            Topics Patient Counseled on      Discontinue adjuvant tx regimen                 Disclaimer: Converted document may not contain table formatting or lab diagrams. Please see [employer] for the authenticated document.

## 2021-05-28 NOTE — PROGRESS NOTES
Patient: DARINEL PEDROZA     Acct: [id number]     Report: #[id number]  UNIT #: [id number]     : 1960    Encounter Date:2018  PRIMARY CARE: MAIK VILLA  ***Signed***  --------------------------------------------------------------------------------------------------------------------  Encounter Date      Sep 18, 2018      ESOPHAGEAL CANCER            History of Present Illness      This is a pleasant 57-year-old female who presents today for continued follow-up    of her distal esophageal adenocarcinoma. Since we last saw her she has initiated    neoadjuvant therapy and has approximately 2 weeks left of chemotherapy and     concurrent radiation. She does admit to dysphagia and occasional odynophagia but    is able to tolerate a soft diet as well as a supplement of choice approximately     3 times a day. Since we last saw her she has lost approximately 4 pounds over     the last month. She states she is otherwise tolerating therapy well however does    feel occasional fatigue after receiving treatment. She otherwise denies pain,     fever, chills, shortness of breath, dyspnea on exertion            Allergies      Coded Allergies:             NO KNOWN ALLERGIES (Unverified , 18)            Yes: Bowel Surgery (COLONOSCOPY), Oral Surgery (WISDOM TEETH REMOVED)      Smoking status:  Former smoker      Alcohol intake:  None      Medical History:  Yes: Chemotherapy/Cancer (SKIN CANCER, ESOPHAGEAL CANCER- NEED    PAC ACCESS), Chronic Bronchitis/COPD (MILD - DOESN'T USE INHALERS ),     Hemorrhoids/Rectal Prob (DYSPHAGIA, REFLUX, NAUSEA, HERNIA), Shortness Of Breath    (TB POSITVE WITH EVERY TEST NOT ACTIVE); No: Arthritis, Asthma, Blood Disease,     Congestive Heart Failu, Deafness or Ringing Ears, Diabetes, Seizures, High Blood    Pressure, Night sweats, Miscellaneous Medical/oth            Medications      Last Reconciled on 18 10:57 by ALAN AUGUSTE,       Lidocaine (Xylocaine 2% Viscous) 100  Ml Solution      10 ML PO AC for 30 Days, #200 ML 3 Refills         Prov: MAURIZIO BANSAL         9/13/18       Sucralfate (Carafate) 1 Gm Tab      1 GM PO ACHS, #120 TAB 0 Refills         Prov: MAURIZIO BANSAL         9/13/18       Hydrocodone/Acetaminophen 5/325 MG (Hydrocodone/Acetaminophen 5/325 MG) 1 Each     Tablet      1 TAB PO Q4H PRN for PAIN, TAB         Reported         8/23/18       Prochlorperazine Maleate (Prochlorperazine Maleate) 10 Mg Tab      10 MG PO Q6H PRN for NAUSEA AND/OR VOMITING, #60 TAB 5 Refills         Reported         8/22/18       Ondansetron HCl (Zofran) 8 Mg Tablet      8 MG PO Q8H PRN for NAUSEA UNRELIEVED BY ZOFRAN for 30 Days, #90 TAB 5 Refills         Reported         8/22/18            Vitals      Height 5 ft 0.98 in / 154.9 cm      Weight 123 lbs 10.849 oz / 56.1 kg      BSA 1.56 m2      BMI 23.4 kg/m2      Temperature 98.3 F / 36.83 C - Temporal      Pulse 87      Respirations 16      Blood Pressure 102/65 Sitting, Left Arm      Pulse Oximetry 100%, RM AIR            General Appearance:  Alert, Oriented X3      HEENT:  Orophraynx clear, No Erythema, No Exudates      Neck:  Supple, No Masses or JVD      Respiratory:  CTAB      Abdomen:  Soft, No NABS, No Masses, No Hernias, No Hepatosplenomegaly      Cardiovascular:  No Chest Tenderness; Regular Rate and Rhythm      Lymphatic:  No Neck      Extremeties:  Pulses Positive all 4 Ext; No Edema      Neurological:  Mental Status WNL      Musculoskeletal:  Normal Bulk Strength      Skin:  No Rash, No Cellulitis      Psychiatric:  Appropriate Affect            Notes      New Diagnostics      * PET Skull Base Midthigh Init, 10/23/18         Dx: Esophageal cancer - C15.9      * Stress Echo/Treadmil, 10/29/18         Dx: Esophageal cancer - C15.9      Ms. Gaitan is a 57-year-old female with clinical stage II/3 esophageal     adenocarcinoma. She is currently receiving neoadjuvant chemotherapy and     radiation and has dysphagia and  odynophagia. She has relatively maintained her     weight for approximately the last month with only losing approximately 4 pounds     she is able to tolerate a soft diet as well as supplements of choice. We had a     sandra and candid discussion with what is involved with feeding tube placement     including the relative contraindication to PEG tube insertion prior to     esophagectomy. At this point time we will defer laparoscopic jejunostomy tube     placement as it would interrupt the final few weeks of her treatment. We     counseled her on supplementation as well as maintaining a soft diet with small     frequent meals to help with her by mouth intake. We will plan to see her     November 6 with a restaging PET/CT as well as stress echo at that point time we     will likely finalize surgical planning. As always she was instructed to call     with any questions or concerns            PREVENTION      Hx Influenza Vaccination:  Yes      Date Influenza Vaccine Given:  Oct 1, 2017      Influenza Vaccine Declined:  No      2 or More Falls Past Year?:  No      Fall Past Year with Injury?:  No      Hx Pneumococcal Vaccination:  No      Encouraged to follow-up with:  PCP regarding preventative exams.      Chart initiated by      ENEIDA VELAZCO MA                 Disclaimer: Converted document may not contain table formatting or lab diagrams. Please see [employer] for the authenticated document.

## 2021-05-28 NOTE — PROGRESS NOTES
"Patient: DARINEL GAITAN     Acct: [id number]     Report: #[id number]  UNIT #: [id number]     : 1960    Encounter Date:01/15/2019  PRIMARY CARE: MAIK VILLA  ***Signed***  --------------------------------------------------------------------------------------------------------------------  NURSE INTAKE      Visit Type      Established Patient Visit            Chief Complaint      esophageal ca      Intent of Therapy:  Curative            Referring Provider/Copies To      Referring Provider:  Cameron Lin            History and Present Illness      Past Oncology Illness History      Mrs. Gaitan is a very pleasant 57-yr-old female who presented with complaints of    food becoming \"clogged\" in her lower esophagus.  Indicates liquids usually do     not feel that way except with eating foods.  She has experienced approximately     20lbs wt loss over the past 2-3mo.  She was referred to Dr. Lin and     EGD/colonoscopy were completed 18.  The esophagus was found to have a     fungating mass of malignant appearance in the gastroesophageal junction.  The     stomach appeared normal; however biopsies taken to eval for gastritis. The     duodenum was normal.  Colonoscopy revealed normal colon.  Unfortunately the     biopsy of the esophageal mass was found to be well-differentiated     adenocarcinoma.  The stomach biopsies were positive for H. pylori.  She was     prescribed treatment for H. pylori.  A PET/CT completed prior to the EGD,     18, found thickening of the distal esophagus at the gastroesophageal     junction.  This area was found to be hypermetabolic with a maximum SUV of 9.3.      Findings would be most compatible with esophageal carcinoma.  There was also a     hypermetabolic metastatic lymph node in the right peritracheal space measuring     1.3 cm in size with maximum SUV of 9.5.  No abnormal hypermetabolic activity     noted elsewhere within the neck, chest, abdomen, or pelvis.         "    HPI - Oncology Interim      Pt returns to clinic for f/u since surgical resection.  She is doing wonderful.     Eating well and has gained several pounds.  She feels very well.  Incisions/G-    tube site are healing well w/o redness/irritation.  Discussed adjuvant therapy.     No fever, lymphadenopathy or distress noted at this time.            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            Clinical Staging      StageII ([id number])            Treatments      Chemotherapy      9/27/18 completed 5wkly neoadjuvant Carboplatin/Taxol treatments (with     concurrent RT)            adjuvant CapeOX      Radiation Therapy      8/30/08- 10/2/18 completed 4140 cGy=23fxs RT to lower esophagus            Clinical Trial Participant      No            ECOG Performance Status      0            PAST, FAMILY   Past Medical History      Other PMH      none      Hematology/Oncology (F):  GI Cancer (esophageal ca), Skin Cancer            Past Surgical History      Biopsy (esophagus), Skin Cancer Removal, VAD Placement      esophageal surgery            Family History      Family History:  Breast Cancer (paternal aunt), Colorectal Cancer (sister)            Social History      Marital Status:        Lives independently:  Yes      Number of Children:  2      Occupation:              Tobacco Use      Tobacco status:  Former smoker            Alcohol Use      Alcohol intake:  None            Substance Use      Substance use:  Denies use            REVIEW OF SYSTEMS      General:  Admits: Fatigue;          Denies: Appetite Change, Fever, Night Sweats, Weight Gain, Weight Loss      Eye:  Denies: Blurred Vision, Corrective Lenses, Diplopia, Eye Irritation, Eye     Pain, Eye Redness, Spots in Vision, Vision Loss      ENT:  Denies: Headache, Hearing Loss, Hoarseness, Seizures, Sinus Congestion,     Sore Throat      Cardiovascular:  Denies: Chest Pain, Edema Ankles, Edema  Legs, Irregular     Heartbeat, Palpitations      Respiratory:  Denies: Coughing Blood, Productive Cough, Shortness of Air,     Wheezing      Gastrointestinal:  Denies: Bloody Stools, Constipation, Diarrhea, Frequent     Heartburn, Nausea, Problem Swallowing, Tarry Stools, Unable to Control Bowels,     Vomiting      Genitourinary (female):  Denies: Blood in Urine, Decrease Urine Stream, Frequent    Urination, Incontinence, Painful Urination      Musculoskeletal:  Denies: Back Pain, Leg Cramps, Muscle Pain, Muscle Weakness,     Painful Joints, Swollen Joints      Integumentary:  Denies: Bleeds Easily, Bruises Easily, Hair Changes, Jaundice,     Lesions, Mole Changes, Nail Changes, Pigment Changes, Rash, Skin Discoloration      Neurologic:  Denies: Dizziness, Fainting, Numbness\Tingling, Paralysis, Seizures      Psychiatric:  Denies: Anxiety, Confused, Depression, Disoriented, Memory Loss      Endocrine:  Denies: Cold Intolerance, Diabetes, Excessive Sweating, Excessive T    liz, Excessive Urination, Heat Intolerance, Flushing, Hyperthyroidism,     Hypothyroidism      Hematologic/Lymphatic:  Denies: Bruising, Bleeding, Enlarged Lymph Nodes,     Recurrent Infections, Transfusions            VITAL SIGNS AND SCORES      Vitals      Height 5 ft 0.63 in / 154 cm      Weight 127 lbs 3.287 oz / 57.7 kg      BSA 1.55 m2      BMI 24.3 kg/m2      Temperature 97.5 F / 36.39 C - Temporal      Pulse 70      Respirations 18      Blood Pressure 131/89 Sitting, Left Arm      Pulse Oximetry 98%, rm air            Pain Score      Pain Scale Used:  Numerical      Pain Intensity:  0            Fatigue Score      Fatigue (0-10 scale):  2            EXAM      General Appearance:  Positive for: Alert, Oriented x3, Cooperative;          Negative for: Acute Distress      Neck:  Positive for: Supple;          Negative for: JVD, Masses      Respiratory:  Positive for: CTAB, Normal Respiratory Effort      Chest:  Port in Place       Abdomen/Gastro:  Positive for: Normal Active Bowel Sounds, Soft;          Negative for: Distention, Hepatosplenomegaly, Tenderness      Other      Well-healed surgical incisions      Cardiovascular:  Positive for: RRR;          Negative for: Gallop, Murmur, Peripheral Edema, Rub      Psychiatric:  Positive for: Appropriate Affect, Intact Judgement      Lower Extremities:  Negative for: Edema      Upper Extremities:  Negative for: Clubbing, Digital Cyanosis, Digital Ischemia      Lymphatic:  Negative for: Axillary, Cervical, Infraclavicular, Supraclavicular            PREVENTION      Hx Influenza Vaccination:  Yes      Date Influenza Vaccine Given:  Oct 1, 2018      Influenza Vaccine Declined:  No      2 or More Falls Past Year?:  No      Fall Past Year with Injury?:  No      Hx Pneumococcal Vaccination:  No      Encouraged to follow-up with:  PCP regarding preventative exams.      Chart initiated by      luis manuel cunningham ma            ALLERGY/MEDS      Allergies      Coded Allergies:             NO KNOWN ALLERGIES (Unverified , 1/15/19)            Medications      Last Reconciled on 1/15/19 10:33 by MICHELLE BUCIO      Capecitabine (Xeloda) 500 Mg Tab      500 MG PO BID for 28 Days, #168 TAB 4 Refills         Prov: MAURIZIO BANSAL         1/15/19       Metoprolol Succ/HCTZ 50/25 Mg (Metoprolol Hctz 50/25 Mg) 1 Each Tablet      1 TAB PO QDAY, TAB         Reported         1/8/19      Medications Reviewed:  Changes made to meds (     )            IMPRESSION/PLAN      Impression      Esophageal cancer pathologic stage II (yp T3, N0, M0).  Status post neoadjuvant     chemo RT followed by resection            Diagnosis      Esophageal cancer         Malignant neoplasm of lower third of esophagus - C15.5         Malignant neoplasm of esophagus location: lower third      Patient has recuperated well from her esophagectomy.  She still had a fair     amount of residual tumor.  Based on the NCCN guidelines, I would  recommend     adjuvant chemotherapy with the Cape ox regimen consisting of capecitibine 1000     mg/m ² twice daily on days 1-14, oxaliplatin 130 mg/m ² IV day 1 of a 3-week cycle    repeated 8 times which would be 6 months of adjuvant therapy.  Side effects,     risks and benefits discussed in detail.  Written teaching information provided.     Patient is agreeable to adjuvant therapy.  She will have lab work today to     ensure adequate endorgan functions and blood counts.  Continue to push nutrition    and fluid intake.  I will plan her initial cycle next week.  I will see her back    for cycle 2, day 1 with labs prior.      New Diagnostics      * CMP Comp Metabolic Panel, Routine      * CBC With Auto Diff, Routine      * Magnesium Serum, Routine            Notes      New Medications      * Capecitabine (Xeloda) 500 MG TAB: 500 MG PO BID 28 Days #168         Instructions: 3tabs (1500mg) twice daily            Patient Education            Capecitabine      Diarrhea      Oxaliplatin Injection      Patient Education Provided:  Yes                 Disclaimer: Converted document may not contain table formatting or lab diagrams. Please see [employer] for the authenticated document.

## 2021-05-28 NOTE — PROGRESS NOTES
Patient: DARINEL GAITAN     Acct: [id number]     Report: #[id number]  UNIT #: [id number]     : 1960    Encounter Date:2019  PRIMARY CARE: MAIK VILLA  ***Signed***  --------------------------------------------------------------------------------------------------------------------  Encounter Date      2019      ESOPHAGEAL CANCER            History of Present Illness      Ms. Gaitan is a 58-year-old female who presents today for routine follow-up     after undergoing an esophagectomy in 2018.  Since we last saw her she     made an attempt to start adjuvant chemotherapy however this was poorly tolerated    even at a reduced dose.  As such she discontinued her adjuvant therapy.  For     historical purposes she did return to the hospital after her esophagectomy when     she developed a lower extremity rash with biopsy-proven Henoch-SchÃ¶nlein     purpura.  Today she is doing well and offers no specific complaints.  She     specifically denies fever, chills, shortness of breath, dyspnea on exertion,     dysphagia, odynophagia.  She states she continues to learn how to eat after her     esophagectomy and finds that some foods that she enjoyed before surgery she is     no longer able to eat such as pizza            Procedure: Bronchoscopy, EGD, minimally invasive [place] Nathan esophagectomy,     laparoscopic J-tube placement on 2018            Allergies      Coded Allergies:             NO KNOWN ALLERGIES (Unverified , 19)            Yes: Bowel Surgery (COLONOSCOPY), Oral Surgery (WISDOM TEETH REMOVED)      Smoking status:  Former smoker      Alcohol intake:  None      Medical History:  Yes: Chemotherapy/Cancer (SKIN CANCER, ESOPHAGEAL CANCER- NEED    PAC ACCESS), Chronic Bronchitis/COPD (MILD - DOESN'T USE INHALERS ),     Hemorrhoids/Rectal Prob (DYSPHAGIA, REFLUX, NAUSEA, HERNIA), Shortness Of Breath    (TB POSITVE WITH EVERY TEST NOT ACTIVE); No: Arthritis, Asthma, Blood Disease,      Congestive Heart Failu, Deafness or Ringing Ears, Diabetes, Seizures, High Blood    Pressure, Night sweats, Miscellaneous Medical/oth            Medications      Last Reconciled on 4/2/19 09:59 by ALAN AUGUSTE DO      Prochlorperazine Maleate (Prochlorperazine Maleate) 10 Mg Tab      10 MG PO Q6H PRN for NAUSEA AND/OR VOMITING, #60 TAB         Reported         1/24/19       Ondansetron Hcl (ONDANSETRON HCL) 8 Mg Tablet      8 MG PO Q8H PRN for NAUSEA, TAB 0 Refills         Reported         1/24/19       Metoprolol Succ/HCTZ 50/25 Mg (Metoprolol Hctz 50/25 Mg) 1 Each Tablet      1 TAB PO QDAY, TAB         Reported         1/8/19            Vitals      Height 5 ft 0.63 in / 154 cm      Weight 110 lbs 0.153 oz / 49.9 kg      BSA 1.46 m2      BMI 21.0 kg/m2      Temperature 99 F / 37.22 C - Temporal      Pulse 66      Respirations 16      Blood Pressure 146/73 Sitting, Left Arm      Pulse Oximetry 99%, RM AIR            General Appearance:  Alert, Oriented X3      HEENT:  Orophraynx clear, No Erythema, No Exudates      Neck:  Supple, No Masses or JVD      Respiratory:  CTAB, Other (Incisions well-healed without erythema or exudate)      Abdomen:  Soft, No NABS, No Masses, No Hernias, No Hepatosplenomegaly (Incisions    well-healed without erythema or exudate)      Cardiovascular:  No Chest Tenderness; Regular Rate and Rhythm      Lymphatic:  No Neck      Extremeties:  Pulses Positive all 4 Ext      Neurological:  Mental Status WNL      Musculoskeletal:  Normal Bulk Strength      Skin:  No Rash, No Cellulitis      Psychiatric:  Appropriate Affect            Imaging/Interpretation      CT chest abdomen pelvis: I personally reviewed the CT of the chest abdomen     pelvis there is no evidence of recurrent or disseminated disease.  Of note there    does appear to be some redundancy of the distal conduit at the level of the     diaphragm            Notes      New Diagnostics      * Abd/Pel W/ Cont CT, 4 Months          Dx: Esophageal cancer - C15.9      * Chest W/ Cont CT, 4 Months         Dx: Esophageal cancer - C15.9      Overall Ms. Gaitan is doing well and we are pleased with her progress.  Her     imaging today shows no evidence of recurrence.  We will plan to see her in 4     months with a CT of the chest abdomen and pelvis for continued surveillance            PREVENTION      Hx Influenza Vaccination:  Yes      Date Influenza Vaccine Given:  Oct 1, 2018      Influenza Vaccine Declined:  No      2 or More Falls Past Year?:  No      Fall Past Year with Injury?:  No      Hx Pneumococcal Vaccination:  No      Encouraged to follow-up with:  PCP regarding preventative exams.      Chart initiated by      ENEIDA VELAZCO MA                 Disclaimer: Converted document may not contain table formatting or lab diagrams. Please see [employer] for the authenticated document.

## 2021-05-28 NOTE — PROGRESS NOTES
"Patient: DARINEL PEDROZA     Acct: [id number]     Report: #[id number]  UNIT #: [id number]     : 1960    Encounter Date:2018  PRIMARY CARE: MAIK VILLA  ***Signed***  --------------------------------------------------------------------------------------------------------------------  Encounter Date      2018      ESOPHAGEAL CANCER            History of Present Illness      This is a 58-year-old female who presents today for follow-up of her distal     esophageal adenocarcinoma.  Since we last saw her she completed her neoadjuvant     therapy the week of .  She states that she tolerated this fairly well.     She states that her dysphasia that she was experiencing prior to treatment has     greatly improved and she is able to eat \"like a horse\".  She denies fever,     chills, shortness of breath, dyspnea on exertion            Allergies      Coded Allergies:             NO KNOWN ALLERGIES (Unverified , 18)            Yes: Bowel Surgery (COLONOSCOPY), Oral Surgery (WISDOM TEETH REMOVED)      Smoking status:  Former smoker      Alcohol intake:  None      Medical History:  Yes: Chemotherapy/Cancer (SKIN CANCER, ESOPHAGEAL CANCER- NEED    PAC ACCESS), Chronic Bronchitis/COPD (MILD - DOESN'T USE INHALERS ),     Hemorrhoids/Rectal Prob (DYSPHAGIA, REFLUX, NAUSEA, HERNIA), Shortness Of Breath    (TB POSITVE WITH EVERY TEST NOT ACTIVE); No: Arthritis, Asthma, Blood Disease,     Congestive Heart Failu, Deafness or Ringing Ears, Diabetes, Seizures, High Blood    Pressure, Night sweats, Miscellaneous Medical/oth            Medications      Last Reconciled on 18 09:50 by ALAN AUGUSTE, DO      No Active Prescriptions or Reported Meds            Vitals      Height 5 ft 0.63 in / 154 cm      Weight 126 lbs 15.759 oz / 57.6 kg      BSA 1.55 m2      BMI 24.3 kg/m2      Temperature 98.1 F / 36.72 C - Temporal      Pulse 76      Blood Pressure 126/62 Sitting, Left Arm      Pulse Oximetry 99%, " ROOM AIR            General Appearance:  Alert, Oriented X3      HEENT:  Orophraynx clear, No Erythema      Neck:  Supple, No Masses or JVD      Respiratory:  CTAB      Abdomen:  Soft, No NABS, No Masses, No Hernias, No Hepatosplenomegaly      Cardiovascular:  No Chest Tenderness      Lymphatic:  No Neck      Extremeties:  Pulses Positive all 4 Ext      Neurological:  Mental Status WNL      Musculoskeletal:  Normal Bulk Strength      Skin:  No Rash, No Cellulitis      Psychiatric:  Appropriate Affect            Imaging/Interpretation      PET/CT 10/22/2018:1.  There has a been marked improvement and decreased     metabolic activity of the mass involving the       distal esophagus extending to the gastroesophageal junction as described above.             2.  Marked decrease in size and metabolic activity of the right peritracheal     lymph node.             3.  Development of a very small 5 mm nodule adjacent to the left lateral aspect     of the lower       descending thoracic aorta having a maximum SUV of 3.3.  A small metastatic lymph    node cannot be       completely excluded.            PFT Results      FEV1 2.56 110%            Other Results      Stress echocardiogram: No evidence of ischemia normal EF            Notes      Discontinued Medications      * Sucralfate (Carafate) 1 GM TAB: 1 GM PO ACHS #120      * Lidocaine (Xylocaine 2% Viscous) 100 ML SOLUTION: 10 ML PO AC 30 Days #200         Instructions: Use with meals or snacks as needed//not limited to 3 times        daily      This is a 58-year-old female who has clinical stage III distal esophageal     adenocarcinoma based on preoperative staging including PET CT.  She has     completed her neoadjuvant therapy and has shown a great response on PET/CT as     well as no evidence of disseminated disease.  We had a sandra and candid     discussion regarding the role of surgery after completion of therapy.  We     discussed the risk and benefits of surgery as  well as what is expected in the     preop perioperative and postoperative period.  We will plan for a minimally     invasive [place] Nathan esophagectomy with laparoscopic J-tube insertion in the near    future.  She is instructed to call with any other questions or concerns that     should arise prior to surgery            PREVENTION      Hx Influenza Vaccination:  Yes      Date Influenza Vaccine Given:  Oct 1, 2017      Influenza Vaccine Declined:  No      2 or More Falls Past Year?:  No      Fall Past Year with Injury?:  No      Hx Pneumococcal Vaccination:  No      Encouraged to follow-up with:  PCP regarding preventative exams.      Chart initiated by      ALVINO PATEL CMA                 Disclaimer: Converted document may not contain table formatting or lab diagrams. Please see [employer] for the authenticated document.

## 2021-05-28 NOTE — PROGRESS NOTES
Patient: DARINEL PEDROZA     Acct: [id number]     Report: #[id number]  UNIT #: [id number]     : 1960    Encounter Date:2019  PRIMARY CARE: MAIK VILLA  ***Signed***  --------------------------------------------------------------------------------------------------------------------  Encounter Date      Aug 13, 2019      ESOPHAGEAL CANCER            History of Present Illness      This is a 58-year-old female who presents to the thoracic surgical clinic today     for continued follow-up after undergoing an esophagectomy in 2018.  She    did attempt to undergo adjuvant chemotherapy but was not able to tolerate this     even at a reduced dose as such she discontinued adjuvant therapy.  Since we last    saw her she is overall doing well and states that she has no trouble with eating    or drinking.  She specifically denies dysphagia, odynophagia, gastroesophageal     reflux disease, nausea, emesis.  She also denies fever, chills, shortness of     breath, dyspnea on exertion.  Of note in the postoperative period she was     readmitted for a rash on her lower extremities that underwent biopsy and was     found to be Henoch-SchÃ¶nlein purpura            Procedure: Bronchoscopy, EGD, minimally invasive [place] Nathan esophagectomy, lap     scopic J-tube insertion 2018            Allergies      Coded Allergies:             NO KNOWN ALLERGIES (Unverified , 19)            Fatigue            Yes: Bowel Surgery (COLONOSCOPY), Oral Surgery (WISDOM TEETH REMOVED)      Smoking status:  Former smoker      Alcohol intake:  None      Medical History:  Yes: Chemotherapy/Cancer (SKIN CANCER, ESOPHAGEAL CANCER- NEED    PAC ACCESS), Chronic Bronchitis/COPD (MILD - DOESN'T USE INHALERS ),     Hemorrhoids/Rectal Prob (DYSPHAGIA, REFLUX, NAUSEA, HERNIA), Shortness Of Breath    (TB POSITVE WITH EVERY TEST NOT ACTIVE); No: Arthritis, Asthma, Blood Disease,     Congestive Heart Failu, Deafness or Ringing Ears,  Diabetes, Seizures, High Blood    Pressure, Night sweats, Miscellaneous Medical/oth            Medications      Last Reconciled on 8/13/19 10:24 by ALAN AUGUSTE,       Acitretin (Soriatane) 25 Mg Cap      25 MG PO QDAY, CAP         Reported         8/13/19       Prochlorperazine Maleate (Prochlorperazine Maleate) 10 Mg Tab      10 MG PO Q6H PRN for NAUSEA AND/OR VOMITING, #60 TAB         Reported         1/24/19       Ondansetron Hcl (ONDANSETRON HCL) 8 Mg Tablet      8 MG PO Q8H PRN for NAUSEA, TAB 0 Refills         Reported         1/24/19            Vitals      Height 5 ft 0.63 in / 154 cm      Weight 109 lbs 9.098 oz / 49.7 kg      BSA 1.46 m2      BMI 21.0 kg/m2      Temperature 98.8 F / 37.11 C - Temporal      Pulse 93      Blood Pressure 129/88 Sitting, Right Arm      Pulse Oximetry 97%, ROOM AIR            General Appearance:  Alert, Oriented X3      HEENT:  Orophraynx clear, No Erythema      Neck:  Supple, No Masses or JVD      Respiratory:  CTAB, Other (Incisions well-healed without erythema or exudate)      Abdomen:  Soft, No NABS, No Masses, No Hernias, No Hepatosplenomegaly (Incisions    well-healed)      Cardiovascular:  No Chest Tenderness; Regular Rate and Rhythm      Lymphatic:  No Neck      Extremeties:  Pulses Positive all 4 Ext      Neurological:  Mental Status WNL      Skin:  No Rash, No Cellulitis      Psychiatric:  Appropriate Affect            Imaging/Interpretation      I personally reviewed the CT of the chest abdomen pelvis:CHEST:      Redemonstration of postsurgical changes of esophagectomy with gastric pull-    through.  Within the       mediastinum abutting the left lateral aspect of the gastric pull-through just     below the level of       the evin there is abnormal soft tissue which measures 1.5 x 1.8 cm which is     new from the prior       study and could relate to recurrent malignancy or adenopathy (axial image 31).      There is also       abnormal soft tissue abutting the  left lateral descending thoracic aorta     measuring 2.5 x 1.2 cm       which is new from the prior study (axial image 38, coronal image 71).               A lymph node located below the left mainstem bronchus is increased in size     measuring 12 x 17 mm on       image 38, previously 6 x 8 mm.  Right hilar node measuring 7 mm in short axis on    image 35 is       stable.  There is new left lower neck adenopathy partially imaged with node     measuring 1.6 x 1.8 cm       on image 5. An additional left supraclavicular node measures 10 mm in short axis    on image 9. There       is a new right paratracheal node measuring 9 mm on image 12.              Heart size normal.  Coronary calcifications noted.  No pericardial effusion.      Negative for       pulmonary embolus.  The trachea and mainstem bronchi are patent.  No focal     consolidation,       pneumothorax, or pleural effusion.  There is scarring at the lateral basilar     right lower lobe.        There is broncholith or granuloma in the left lower lobe on image 44 which is     unchanged.  No new       pulmonary nodule or mass.               ABDOMEN AND PELVIS:      The liver and spleen are normal in size and contour.  There is a small hypodense    lesion in the left       hepatic lobe which is stable on image 9, too small to characterize measuring 4     mm, likely small       cyst.  Stable thickening of the left adrenal gland which may relate to     hyperplasia.  The right       adrenal gland is normal.  Pancreas without findings of pancreatitis.  The gall    bladder is present.        There is mild dilatation of the common bile duct up to 7 mm which is unchanged.     The kidneys       demonstrate symmetric enhancement.  There is no hydronephrosis or obstructive     uropathy.  Urinary       bladder is thin-walled.  Uterus and adnexa without acute abnormality.  Prominent    pelvic venous       vessels noted adjacent to the left aspect of the uterus.              Redemonstration of postsurgical changes of esophagectomy and gastric pull-    through as described       above.  No bowel obstruction.  No pneumoperitoneum or pneumatosis intestinalis.     Oral contrast       reaches the level of the splenic flexure.  Negative for appendicitis.  The abdo    minna aortic branch       vasculature is patent.  There is redemonstration of a inferior endplate     compression fracture at L4       with 25% anterior height loss, unchanged from the prior study.  Negative for new    fracture.             CONCLUSION:         1. Postsurgical changes of esophagectomy and gastric pull-through.  New abnormal    mediastinal soft       tissue at the left aspect of the gastric pull-through at the level of the evin    concerning for       recurrent malignancy.  New abnormal soft tissue adjacent to the descending     thoracic aorta also       concerning for malignancy and could be further assessed with PET-CT.      2. Partially imaged new adenopathy in the left lower neck and left     supraclavicular region       concerning for malignant adenopathy.  Increase in size of mediastinal node below    the left mainstem       bronchus and new right paratracheal node also likely malignant adenopathy.        3. No findings of metastatic disease in the abdomen or pelvis.      4. No change in appearance of L4 inferior endplate compression fracture.            Notes      New Medications      * Acitretin (Soriatane) 25 MG CAP: 25 MG PO QDAY      New Diagnostics      * PET SKULLBASE MID THIGH SUBSQ, Routine         Dx: Esophageal cancer - C15.9      Ms. Gaitan presents today for continued follow-up after undergoing an [place] Nathan    esophagectomy.  Her imaging today shows soft tissue thickening in the yon-    conduit region at the level of the subcarina there is also question of some     increased lymphadenopathy compared to her previous CT scan.  We did discuss     these findings with the patient.  We will plan  for a PET CT to further     characterize these areas.  We will have her return to the office in 2 weeks for     follow-up after undergoing the PET/CT            PREVENTION      Hx Influenza Vaccination:  Yes      Date Influenza Vaccine Given:  Oct 1, 2018      Influenza Vaccine Declined:  No      2 or More Falls Past Year?:  No      Fall Past Year with Injury?:  No      Hx Pneumococcal Vaccination:  No      Encouraged to follow-up with:  PCP regarding preventative exams.      Chart initiated by      ALVINO PATEL CMA                 Disclaimer: Converted document may not contain table formatting or lab diagrams. Please see [employer] for the authenticated document.

## 2021-05-28 NOTE — PROGRESS NOTES
"Patient: DARINEL GAITAN     Acct: [id number]     Report: #[id number]  UNIT #: [id number]     : 1960    Encounter Date:2019  PRIMARY CARE: MAIK VILLA  ***Signed***  --------------------------------------------------------------------------------------------------------------------  NURSE INTAKE      Visit Type      Established Patient Visit            Chief Complaint      ESOPHAGEAL CA      Intent of Therapy:  Curative            Referring Provider/Copies To      Referring Provider:  Cameron Lin      Primary Care Provider:  MAIK VILLA            History and Present Illness      Past Oncology Illness History      Mrs. Gaitan is a very pleasant 57-yr-old female who presented with complaints of    food becoming \"clogged\" in her lower esophagus.  Indicates liquids usually do     not feel that way except with eating foods.  She has experienced approximately     20lbs wt loss over the past 2-3mo.  She was referred to Dr. Lin and     EGD/colonoscopy were completed 18.  The esophagus was found to have a     fungating mass of malignant appearance in the gastroesophageal junction.  The     stomach appeared normal; however biopsies taken to eval for gastritis. The     duodenum was normal.  Colonoscopy revealed normal colon.  Unfortunately the     biopsy of the esophageal mass was found to be well-differentiated     adenocarcinoma.  The stomach biopsies were positive for H. pylori.  She was     prescribed treatment for H. pylori.  A PET/CT completed prior to the EGD,     18, found thickening of the distal esophagus at the gastroesophageal     junction.  This area was found to be hypermetabolic with a maximum SUV of 9.3.      Findings would be most compatible with esophageal carcinoma.  There was also a     hypermetabolic metastatic lymph node in the right peritracheal space measuring     1.3 cm in size with maximum SUV of 9.5.  No abnormal hypermetabolic activity     noted elsewhere within " the neck, chest, abdomen, or pelvis.            HPI - Oncology Interim      F/u esophageal ca--doing very well-reports eating and has gained wt; however,     cannot eat alot so trying to eat several meals per day and during the day she     gets busy and forgets to eat. She cannot tolerate many things she previously did    therefore eating much healthier foods and always has snacks at her desk.  She is    swallowing without difficulty or discomfort.  No distress noted.            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            Clinical Staging      StageII (iC6N5S3)            Treatments      Chemotherapy      9/27/18 completed 5wkly neoadjuvant Carboplatin/Taxol treatments (with     concurrent RT)            adjuvant CapeOX-poor tolerance-dc 3/7/19      Radiation Therapy      8/30/08- 10/2/18 completed 4140 cGy=23fxs RT to lower esophagus            Clinical Trial Participant      No            ECOG Performance Status      0            Most Recent Imaging Findings      3/26/19            REQ #:[id number]   EXAM:CTACPWC - CT ABD CHEST PEL w CONTR      REASON FOR EXAM:        REASON FOR VISIT:  ESOPHAGEAL CA            *******Signed******         PROCEDURE:   CT ABDOMEN; CT CHEST; CT PELVIS WITH CONTRAST             COMPARISON:   Other, CT, CT CHEST ABD W CONTRAST, 7/19/2018, 8:56.  Other, CT,     CT CHEST ABD PEL W       CONTRAST, 7/19/2018, 8:57.             INDICATIONS:   ESOPHAGEAL CANCER restaging.  Observation for metastatic disease             TECHNIQUE:   After obtaining the patient's consent, CT images were obtained with    intravenous contrast       material.      PROTOCOL:     Standard imaging protocol performed                RADIATION:     DLP: 591.9mGy*cm          Automated exposure control was utilized to minimize radiation dose.       CONTRAST:   75cc Optiray 350 I.V.      LABS:     eGFR: >60ml/min/1.73m2             FINDINGS:         Chest:  Linear  scarring or atelectasis in the lung bases.  Lungs are otherwise     clear.  The patient       is status post esophagectomy with gastric pull-through.  No mediastinal mass.      Small lymph node       anterior to the descending thoracic aorta at the level of the left pulmonary     artery measures 8 mm       and is not significantly changed.  A mildly prominent right hilar lymph node     measures 1.4 cm and is       unchanged.  No new or enlarging adenopathy in the chest.             Abdomen:  Subcentimeter cyst towards the dome of the liver.  Otherwise liver is     unremarkable.        Spleen, pancreas, and gallbladder are unremarkable.  Low-attenuation fullness in    the left adrenal       gland is unchanged.  Low attenuation in the right adrenal gland is also similar.     Bowel loops are       nondilated.  No pathologically enlarged abdominal lymph nodes.             Pelvis:  No pelvic mass or adenopathy.  No aggressive appearing bone lesion.      There is mild       inferior endplate compression at L4, which is new since 7/19/2018.             CONCLUSION:   Status post esophagectomy with gastric pull-through.             No definitive evidence for active metastatic disease in the chest, abdomen, or     pelvis.             Age-indeterminate mild inferior endplate compression at L4 is new since     7/19/2018            PAST, FAMILY   Past Medical History      Hematology/Oncology (F):  GI Cancer (esophageal ca), Skin Cancer            Past Surgical History      Biopsy (esophagus), Skin Cancer Removal, VAD Placement            Family History      Family History:  Breast Cancer (paternal aunt), Colorectal Cancer (sister)            Social History      Marital Status:        Lives independently:  Yes      Number of Children:  2      Occupation:              Tobacco Use      Tobacco status:  Former smoker            Alcohol Use      Alcohol intake:  None            Substance Use      Substance use:   Denies use            REVIEW OF SYSTEMS      General:  Admits: Fatigue;          Denies: Appetite Change, Fever, Night Sweats, Weight Gain, Weight Loss      Eye:  Denies: Blurred Vision, Corrective Lenses, Diplopia, Eye Irritation, Eye     Pain, Eye Redness, Spots in Vision, Vision Loss      ENT:  Denies: Headache, Hearing Loss, Hoarseness, Seizures, Sinus Congestion,     Sore Throat      Cardiovascular:  Denies: Chest Pain, Edema Ankles, Edema Legs, Irregular     Heartbeat, Palpitations      Respiratory:  Denies: Coughing Blood, Productive Cough, Shortness of Air,     Wheezing      Gastrointestinal:  Denies: Bloody Stools, Constipation, Diarrhea, Frequent     Heartburn, Nausea, Problem Swallowing, Tarry Stools, Unable to Control Bowels,     Vomiting      Genitourinary (female):  Denies: Blood in Urine, Decrease Urine Stream, Frequent    Urination, Incontinence, Painful Urination      Musculoskeletal:  Denies: Back Pain, Leg Cramps, Muscle Pain, Muscle Weakness,     Painful Joints, Swollen Joints      Integumentary:  Denies: Bleeds Easily, Bruises Easily, Hair Changes, Jaundice,     Lesions, Mole Changes, Nail Changes, Pigment Changes, Rash, Skin Discoloration      Neurologic:  Denies: Dizziness, Fainting, Numbness\Tingling, Paralysis, Seizures      Psychiatric:  Denies: Anxiety, Confused, Depression, Disoriented, Memory Loss      Endocrine:  Denies: Cold Intolerance, Diabetes, Excessive Sweating, Excessive     Thirst, Excessive Urination, Heat Intolerance, Flushing, Hyperthyroidism,     Hypothyroidism      Hematologic/Lymphatic:  Denies: Bruising, Bleeding, Enlarged Lymph Nodes,     Recurrent Infections, Transfusions            VITAL SIGNS AND SCORES      Vitals      Height 5 ft 0.63 in / 154 cm      Weight 112 lbs 6.954 oz / 51 kg      BSA 1.47 m2      BMI 21.5 kg/m2      Temperature 98.1 F / 36.72 C - Temporal      Pulse 67      Respirations 16      Blood Pressure 147/80 Sitting, Left Arm      Pulse Oximetry  100%, RM AIR            Pain Score      Pain Scale Used:  Numerical      Pain Intensity:  0            Fatigue Score      Fatigue (0-10 scale):  2            EXAM      General Appearance:  Positive for: Alert, Oriented x3, Cooperative;          Negative for: Acute Distress      Eye:  Positive for: Anicteric Sclerae, Moist Conjunctiva      Neck:  Positive for: Supple;          Negative for: JVD, Masses      Respiratory:  Positive for: CTAB, Normal Respiratory Effort      Chest:  Port in Place      Abdomen/Gastro:  Positive for: Normal Active Bowel Sounds, Soft;          Negative for: Distention, Hepatosplenomegaly, Tenderness      Cardiovascular:  Positive for: RRR;          Negative for: Gallop, Murmur, Peripheral Edema, Rub      Psychiatric:  Positive for: Appropriate Affect, Intact Judgement      Upper Extremities:  Negative for: Clubbing, Digital Cyanosis, Digital Ischemia      Lymphatic:  Negative for: Axillary, Cervical, Infraclavicular, Supraclavicular            PREVENTION      Hx Influenza Vaccination:  Yes      Date Influenza Vaccine Given:  Oct 1, 2018      Influenza Vaccine Declined:  No      2 or More Falls Past Year?:  No      Fall Past Year with Injury?:  No      Hx Pneumococcal Vaccination:  No      Encouraged to follow-up with:  PCP regarding preventative exams.      Chart initiated by      ENEIDA VELAZCO MA            ALLERGY/MEDS      Allergies      Coded Allergies:             NO KNOWN ALLERGIES (Unverified , 4/17/19)            Medications      Last Reconciled on 4/17/19 12:54 by MICHELLE BUCIO      Prochlorperazine Maleate (Prochlorperazine Maleate) 10 Mg Tab      10 MG PO Q6H PRN for NAUSEA AND/OR VOMITING, #60 TAB         Reported         1/24/19       Ondansetron Hcl (ONDANSETRON HCL) 8 Mg Tablet      8 MG PO Q8H PRN for NAUSEA, TAB 0 Refills         Reported         1/24/19       Metoprolol Succ/HCTZ 50/25 Mg (Metoprolol Hctz 50/25 Mg) 1 Each Tablet      1 TAB PO QDAY, TAB          Reported         1/8/19      Medications Reviewed:  No Changes made to meds            IMPRESSION/PLAN      Impression      Esophageal cancer            Diagnosis      Esophageal cancer         Malignant neoplasm of lower third of esophagus - C15.5         Malignant neoplasm of esophagus location: lower third      Status post treatment as above including neoadjuvant chemo RT followed by     resection.  Attempted adjuvant therapy but patient was intolerant.  Patient is     doing well.  I see no evidence of disease recurrence by history, physical     examination or recent CT scan.  Port flush monthly.  RTC 6 months for ongoing     surveillance      New Diagnostics      * CMP Comp Metabolic Panel, Routine      * CBC With Auto Diff, Routine            Patient Education            Nutritional Support (General) (Alternative Therapy)      Patient Education Provided:  Yes                 Disclaimer: Converted document may not contain table formatting or lab diagrams. Please see [employer] for the authenticated document.

## 2021-05-28 NOTE — PROGRESS NOTES
"Patient: DARINEL GAITAN     Acct: [id number]     Report: #[id number]  UNIT #: [id number]     : 1960    Encounter Date:2019  PRIMARY CARE: MAIK VILLA  ***Signed***  --------------------------------------------------------------------------------------------------------------------  NURSE INTAKE      Visit Type      Established Patient Visit            Chief Complaint      ESOPHAGEAL CA      Intent of Therapy:  Curative            Referring Provider/Copies To      Referring Provider:  Cameron Lin            History and Present Illness      Past Oncology Illness History      Mrs. Gaitan is a very pleasant 57-yr-old female who presented with complaints of    food becoming \"clogged\" in her lower esophagus.  Indicates liquids usually do     not feel that way except with eating foods.  She has experienced approximately     20lbs wt loss over the past 2-3mo.  She was referred to Dr. Lin and     EGD/colonoscopy were completed 18.  The esophagus was found to have a     fungating mass of malignant appearance in the gastroesophageal junction.  The     stomach appeared normal; however biopsies taken to eval for gastritis. The     duodenum was normal.  Colonoscopy revealed normal colon.  Unfortunately the     biopsy of the esophageal mass was found to be well-differentiated     adenocarcinoma.  The stomach biopsies were positive for H. pylori.  She was     prescribed treatment for H. pylori.  A PET/CT completed prior to the EGD,     18, found thickening of the distal esophagus at the gastroesophageal     junction.  This area was found to be hypermetabolic with a maximum SUV of 9.3.      Findings would be most compatible with esophageal carcinoma.  There was also a     hypermetabolic metastatic lymph node in the right peritracheal space measuring     1.3 cm in size with maximum SUV of 9.5.  No abnormal hypermetabolic activity     noted elsewhere within the neck, chest, abdomen, or pelvis.         "    HPI - Oncology Interim      Patient returns today for discussion of adjuvant treatment for her esophageal ca    ncer.  At her last visit, she was started on adjuvant Cape ox.  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  This is now     resolved but was very problematic for her while it was occurring.  She also     reports significant issues with nausea and vomiting.  She was only able to take     4 days of her Xeloda and then it became too much for her and she opted to stop.     She had nausea and vomiting despite Zofran and Compazine use.  She lost 7-8     pounds and felt very weak and washed out.            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            Clinical Staging      StageII ([id number])            Treatments      Chemotherapy      9/27/18 completed 5wkly neoadjuvant Carboplatin/Taxol treatments (with     concurrent RT)            adjuvant CapeOX      Radiation Therapy      8/30/08- 10/2/18 completed 4140 cGy=23fxs RT to lower esophagus            Clinical Trial Participant      No            ECOG Performance Status      0            PAST, FAMILY   Past Medical History      Other PMH      ESOPHAGEAL CA      Hematology/Oncology (F):  GI Cancer (esophageal ca), Skin Cancer            Past Surgical History      Biopsy (esophagus), Skin Cancer Removal, VAD Placement            Family History      Family History:  Breast Cancer (paternal aunt), Colorectal Cancer (sister)            Social History      Marital Status:        Lives independently:  Yes      Number of Children:  2      Occupation:              Tobacco Use      Tobacco status:  Former smoker            Alcohol Use      Alcohol intake:  None            Substance Use      Substance use:  Denies use            REVIEW OF SYSTEMS      General:  Admits: Appetite Change, Fatigue, Weight Loss;          Denies: Fever, Night Sweats, Weight Gain       Eye:  Denies: Blurred Vision, Corrective Lenses, Diplopia, Eye Irritation, Eye     Pain, Eye Redness, Spots in Vision, Vision Loss      ENT:  Denies: Headache, Hearing Loss, Hoarseness, Seizures, Sinus Congestion,     Sore Throat      Cardiovascular:  Denies: Chest Pain, Edema Ankles, Edema Legs, Irregular     Heartbeat, Palpitations      Respiratory:  Denies: Coughing Blood, Productive Cough, Shortness of Air,     Wheezing      Gastrointestinal:  Denies: Bloody Stools, Constipation, Diarrhea, Frequent     Heartburn, Nausea, Problem Swallowing, Tarry Stools, Unable to Control Bowels,     Vomiting      Genitourinary (female):  Denies: Blood in Urine, Decrease Urine Stream, Frequent    Urination, Incontinence, Painful Urination      Musculoskeletal:  Denies: Back Pain, Leg Cramps, Muscle Pain, Muscle Weakness,     Painful Joints, Swollen Joints      Integumentary:  Denies: Bleeds Easily, Bruises Easily, Hair Changes, Jaundice,     Lesions, Mole Changes, Nail Changes, Pigment Changes, Rash, Skin Discoloration      Neurologic:  Denies: Dizziness, Fainting, Numbness\Tingling, Paralysis, Seizures      Psychiatric:  Denies: Anxiety, Confused, Depression, Disoriented, Memory Loss      Endocrine:  Denies: Cold Intolerance, Diabetes, Excessive Sweating, Excessive     Thirst, Excessive Urination, Heat Intolerance, Flushing, Hyperthyroidism,     Hypothyroidism      Hematologic/Lymphatic:  Denies: Bruising, Bleeding, Enlarged Lymph Nodes,     Recurrent Infections, Transfusions            VITAL SIGNS AND SCORES      Vitals      Height 5 ft 0.63 in / 154 cm      Weight 115 lbs 15.391 oz / 52.6 kg      BSA 1.49 m2      BMI 22.2 kg/m2      Temperature 98.5 F / 36.94 C - Temporal      Pulse 90      Respirations 18      Blood Pressure 147/81 Sitting, Left Arm      Pulse Oximetry 100%, RM AIR            Pain Score      Pain Intensity:  0            Fatigue Score      Fatigue (0-10 scale):  5            EXAM      General Appearance:   Positive for: Alert, Oriented x3, Cooperative;          Negative for: Acute Distress      Neck:  Negative for: JVD, Masses, Supple      Respiratory:  Positive for: CTAB, Normal Respiratory Effort      Chest:  Port in Place      Abdomen/Gastro:  Positive for: Normal Active Bowel Sounds, Soft;          Negative for: Distention, Hepatosplenomegaly, Tenderness      Other      Well-healed surgical incision      Cardiovascular:  Positive for: RRR;          Negative for: Gallop, Murmur, Peripheral Edema, Rub      Psychiatric:  Positive for: Appropriate Affect, Intact Judgement            PREVENTION      Hx Influenza Vaccination:  Yes      Date Influenza Vaccine Given:  Oct 1, 2018      Influenza Vaccine Declined:  No      2 or More Falls Past Year?:  No      Fall Past Year with Injury?:  No      Hx Pneumococcal Vaccination:  No      Encouraged to follow-up with:  PCP regarding preventative exams.      Chart initiated by      ENEIDA VELAZCO MA            ALLERGY/MEDS      Allergies      Coded Allergies:             NO KNOWN ALLERGIES (Unverified , 2/5/19)            Medications      Last Reconciled on 2/5/19 13:58 by NANCY KNOX      Prochlorperazine Maleate (Prochlorperazine Maleate) 10 Mg Tab      10 MG PO Q6H PRN for NAUSEA AND/OR VOMITING, #60 TAB         Reported         1/24/19       Ondansetron HCl (Zofran) 8 Mg Tablet      8 MG PO Q8H PRN for NAUSEA, TAB 0 Refills         Reported         1/24/19       Capecitabine (Xeloda) 500 Mg Tab      500 MG PO BID for 28 Days, #168 TAB 4 Refills         Prov: MAURIZIO BANSAL         1/15/19       Metoprolol Succ/HCTZ 50/25 Mg (Metoprolol Hctz 50/25 Mg) 1 Each Tablet      1 TAB PO QDAY, TAB         Reported         1/8/19      Medications Reviewed:  No Changes made to meds            IMPRESSION/PLAN      Impression      Stage II esophageal cancer.  Status post neoadjuvant chemo RT, surgery and now     on adjuvant treatment.            Diagnosis      Esophageal cancer          Malignant neoplasm of esophagus, unspecified location         Malignant neoplasm of esophagus location: unspecified location      Patient is currently receiving adjuvant Cape ox.  She had cycle 1 but had     significant issues with neuropathy, nausea and vomiting.  Her next cycle is due     on 2/14/19.  Because of the side effects, she will have a dose reduction of the     Xeloda to 1 g twice daily day 1-14 out of 21.  I would also dose reduce the     oxaliplatin to 100 mg/m ².  We discussed using Zofran and Compazine prior to each    dose of Xeloda to try and prevent nausea and vomiting.  Push nutrition and fluid    intake.  I will see her back for cycle 2, day 1 with labs prior.            Patient Education      Patient Education Provided:  Yes                 Disclaimer: Converted document may not contain table formatting or lab diagrams. Please see [employer] for the authenticated document.

## 2021-05-28 NOTE — PROGRESS NOTES
"Patient: DARINEL GAITAN     Acct: [id number]     Report: #[id number]  UNIT #: [id number]     : 1960    Encounter Date:2018  PRIMARY CARE: MAIK VILLA  ***Signed***  --------------------------------------------------------------------------------------------------------------------  NURSE INTAKE      Visit Type      Established Patient Visit            Chief Complaint      ESOPHAGEAL CANCER      Intent of Therapy:  Curative            Referring Provider/Copies To      Referring Provider:  Cameron Lin            History and Present Illness      Past Oncology Illness History      Mrs. Gaitan is a very pleasant 57-yr-old female who presented with complaints of    food becoming \"clogged\" in her lower esophagus.  Indicates liquids usually do     not feel that way except with eating foods.  She has experienced approximately     20lbs wt loss over the past 2-3mo.  She was referred to Dr. Lin and     EGD/colonoscopy were completed 18.  The esophagus was found to have a funga    ting mass of malignant appearance in the gastroesophageal junction.  The stomach    appeared normal; however biopsies taken to eval for gastritis. The duodenum was     normal.  Colonoscopy revealed normal colon.  Unfortunately the biopsy of the     esophageal mass was found to be well-differentiated adenocarcinoma.  The stomach    biopsies were positive for H. pylori.  She was prescribed treatment for H.     pylori.  A PET/CT completed prior to the EGD, 18, found thickening of the     distal esophagus at the gastroesophageal junction.  This area was found to be     hypermetabolic with a maximum SUV of 9.3.  Findings would be most compatible     with esophageal carcinoma.  There was also a hypermetabolic metastatic lymph     node in the right peritracheal space measuring 1.3 cm in size with maximum SUV     of 9.5.  No abnormal hypermetabolic activity noted elsewhere within the neck,     chest, abdomen, or pelvis.       "      HPI - Oncology Interim      Returns to clinic, approximately 4wks post-tx.  Doing extremely well.  Appetite     very good; denies swallowing difficulty at this time.  PET/CT 10/22/18 showed     marked improvement and decreased metabolic activity of the mass involving the     distal esophagus extending to the gastroesophageal junction as described above,     marked decrease in size and metabolic activity of the right peritracheal lymph     node, development of a very small 5 mm nodule adjacent to the left lateral     aspect of the lower descending thoracic aorta having a maximum SUV of 3.3. (A     small metastatic lymph node cannot be completely excluded)  She will tentatively    have sx with Dr. Auguste at the end of Nov/beginning of Dec.  Weight is up soila    roximately 2-3lbs.  Denies dysphagia, fever, lymphadenopathy at this time.            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            Clinical Staging      Unable to completely stage at this time; at least Stage II d/t positive node     metastasis            Treatments      Chemotherapy      18 completed 5wkly Carboplatin/Taxol treatments (with concurrent RT)      Radiation Therapy      08- 10/2/18 completed 4140 cGy=23fxs RT to lower esophagus            Clinical Trial Participant      No            ECOG Performance Status      0            Most Recent Imaging Findings      Patient: DARINEL PEDROZA   Acct: #[id number]   Report: #[id number]            UNIT #: [id number]    DOS: 10/22/18 0829      INSURANCE:[employer] [facility] - [facility]   ORDER #:[id number]      LOCATION:PET     : 1960            PROVIDERS      ADMITTING:     ATTENDING: ALAN AUGUSTE      FAMILY:  MAIK VILLA   ORDERING:  ALAN AUGUSTE         OTHER:    DICTATING:  Jose Muller MD            REQ #:[id number]   EXAM:[id number] - SKULL BASE TO MID THIGH SUBSEQ      REASON FOR EXAM:  ESOPHAGEAL CA      REASON FOR VISIT:   ESOPHAGEAL CA            *******Signed******         PROCEDURE:   PET CT SKULL BASE TO MID THIGH SUBSEQ             COMPARISON:   [facility], PET, SKULL BASE TO MID THIGH INITIAL,     7/27/2018, 9:07.             INDICATIONS:   ESOPHAGEAL CA             TECHNIQUE:   After obtaining the patient's consent, F-18 FDG was administered     intravenously.  PET/CT       imaging was performed from skull to thigh with multi-planar imaging without oral    or intravenous       contrast material, using a dedicated integrated PET/CT scanner.               RADIONUCLIDE:     10.57 MCI   F18 FDG- I.V.      LABS:                          Blood Glucose 88 mg/dl             FINDINGS:         On the previous nuclear medicine PET scan was a 1.3 cm right peritracheal lymph     node just below the       thoracic inlet with a maximum SUV of 9.5.  This lymph node today now measures     only 8 mm in diameter       and has a maximum SUV of 1.9.  On the previous nuclear medicine PET scan was a     large area of of       increased metabolic activity involving the distal esophagus extending to the     gastroesophageal       junction measuring up to 2.5 cm in dimension with a maximum SUV of 9.3.  On     today's CT scan there       persists some thickening of the distal esophageal wall up to 8 mm on axial image    number 109 with a       maximum SUV now of only 2.8.  The activity extending to the gastroesophageal     junction has also       markedly diminished now measuring only 3.5.             On axial image number 96 there is a very small rounded lymph node abutting the     left lateral aspect       of the descending thoracic aorta located just posterior and inferior to the left    lower lobe       pulmonary artery with a maximum SUV of 3.3.  This is a new finding from the     previous study.  There       are diffuse moderately extensive areas of ground-glass opacification dependently    located       posteriorly in the lower lobes  similar to the previous study.  Previously was     noted a small 5 mm       nodule in the right lower lobe abutting the midportion of the major fissure on     the right which is       unchanged in size and shows no significant metabolic activity.             There are no other abnormal areas of increased radiopharmaceutical activity to     suggest malignancy.        There is no adenopathy in the neck.  Non intravenously enhanced images of the     liver and spleen and       adrenal glands and kidneys and pancreas appear normal.             CONCLUSION:                1.  There has a been marked improvement and decreased metabolic activity of the     mass involving the       distal esophagus extending to the gastroesophageal junction as described above.             2.  Marked decrease in size and metabolic activity of the right peritracheal     lymph node.             3.  Development of a very small 5 mm nodule adjacent to the left lateral aspect     of the lower       descending thoracic aorta having a maximum SUV of 3.3.  A small metastatic lymph    node cannot be       completely excluded.              GEORGIA CAMILO MD             Electronically Signed and Approved By: GEORGIA CAMILO MD on 10/22/2018 at 10:47                           Until signed, this is an unconfirmed preliminary report that may contain      errors and is subject to change.                                              SCHJ1:      D:10/22/18 1047            PAST, FAMILY   Social History      Lives independently:  Yes      Occupation:              Tobacco Use      Tobacco status:  Former smoker            Alcohol Use      Alcohol intake:  None            Substance Use      Substance use:  Denies use            VITAL SIGNS AND SCORES      Vitals      Height 5 ft 0.63 in / 154 cm      Weight 126 lbs 15.759 oz / 57.6 kg      BSA 1.55 m2      BMI 24.3 kg/m2      Temperature 98.1 F / 36.72 C - Temporal      Pulse 76      Blood Pressure  126/62 Sitting, Left Arm      Pulse Oximetry 99%, ROOM AIR            Pain Score      Pain Scale Used:  Numerical      Pain Intensity:  0            Fatigue Score      Fatigue (0-10 scale):  0 (none)            EXAM      General Appearance:  Positive for: Alert, Oriented x3, Cooperative;          Negative for: Acute Distress      Eye:  Positive for: Anicteric Sclerae, Moist Conjunctiva      Respiratory:  Positive for: CTAB, Normal Respiratory Effort      Chest:  Port in Place      Abdomen/Gastro:  Positive for: Normal Active Bowel Sounds, Soft;          Negative for: Distention, Hepatosplenomegaly, Tenderness      Cardiovascular:  Positive for: RRR;          Negative for: Gallop, Murmur, Peripheral Edema, Rub      Psychiatric:  Positive for: Appropriate Affect, Intact Judgement      Upper Extremities:  Negative for: Clubbing, Digital Cyanosis, Digital Ischemia      Lymphatic:  Negative for: Axillary, Cervical, Infraclavicular, Supraclavicular            PREVENTION      Hx Influenza Vaccination:  Yes      Date Influenza Vaccine Given:  Oct 1, 2017      Influenza Vaccine Declined:  No      2 or More Falls Past Year?:  No      Fall Past Year with Injury?:  No      Hx Pneumococcal Vaccination:  No      Encouraged to follow-up with:  PCP regarding preventative exams.      Chart initiated by      ALVINO PATEL CMA            ALLERGY/MEDS      Allergies      Coded Allergies:             NO KNOWN ALLERGIES (Unverified , 11/6/18)            Medications      Last Reconciled on 11/6/18 10:18 by MICHELLE BUCIO      No Medication Information Entered            Medications Reviewed:  No Changes made to meds            IMPRESSION/PLAN      Impression      Esophageal cancer.  Status post neoadjuvant chemo RT with very good partial     response.            Diagnosis      Esophageal cancer         Malignant neoplasm of lower third of esophagus - C15.5         Malignant neoplasm of esophagus location: lower third      Case was  discussed with Dr. Cristian Huerta, CT surgery from [facility].  He plans to take patient for resection later this month.  Clinically     she is doing very well.  All acute toxicities are resolving.  Port flush today     and monthly.  I will see her back in the postoperative setting to review.      New Diagnostics      * CMP Comp Metabolic Panel, Routine      * CBC With Auto Diff, Routine            Patient Education      Patient Education Provided:  Yes                 Disclaimer: Converted document may not contain table formatting or lab diagrams. Please see [employer] for the authenticated document.

## 2021-05-28 NOTE — PROGRESS NOTES
Patient: DARINEL PEDROZA     Acct: [id number]     Report: #[id number]  UNIT #: [id number]     : 1960    Encounter Date:2018  PRIMARY CARE: MAIK VILLA  ***Signed***  --------------------------------------------------------------------------------------------------------------------  Dear Dr. Soto            Encounter Date      Aug 21, 2018      ESOPHAGEAL CANCER            History of Present Illness      This is a 57-year-old female with a recent diagnosis of distal esophageal     adenocarcinoma. She states that her symptomatology goes back approximately 2-3     months with dysphagia to solids and now occasionally liquid food she states she     is approximately lost 20 pounds over this period of time she is able to     tolerate a soft diet and liquids without much difficulty at this point. She was     referred to Dr. Lin and underwent an EGD that showed a biopsy-proven     distal esophageal mass. She has met with medical as well as radiation oncology     and completed a workup that includes a PET/CT as well as CT of the chest     abdomen and pelvis.            Past surgical history: Denies            Allergies      Coded Allergies:             NO KNOWN DRUG ALLERGIES (Unverified  Allergy, Unknown, 18)            Yes: Bowel Surgery (COLONOSCOPY), Oral Surgery (WISDOM TEETH)      Smoking status:  Former smoker      Alcohol intake:  None      Medical History:  Yes: Chemotherapy/Cancer (SKIN CANCER), Chronic Bronchitis/    COPD (MILD), Hemorrhoids/Rectal Prob (DYSPHAGIA,REFLUX, NAUSEA ), Shortness Of     Breath (TB POSITVE WITH EVERY TEST NOT ACTIVE), No: Blood Disease, Deafness or     Ringing Ears, Night sweats, Miscellaneous Medical/oth            Medications      Last Reconciled on 18 11:24 by ALAN AUGUSTE DO      Prochlorperazine Maleate (Prochlorperazine Maleate) 10 Mg Tab      10 MG PO QID Y for nausea unrelieved by Zofran, #60 TAB 5 Refills         Prov: MAURIZIO BANSAL          8/16/18       Ondansetron HCl (Zofran) 8 Mg Tablet      8 MG PO Q8H Y for NAUSEA UNRELIEVED BY ZOFRAN for 30 Days, #90 TAB 5 Refills         Prov: MAURIZIO BANSAL         8/16/18            Vitals      Height 5 ft 2.48 in / 158.7 cm      Weight 127 lbs 6.814 oz / 57.8 kg      BSA 1.60 m2      BMI 22.9 kg/m2      Temperature 98.0 F / 36.67 C - Temporal      Pulse 62      Blood Pressure 136/75 Sitting, Left Arm      Pulse Oximetry 100%, ROOM AIR            General Appearance:  Alert, Oriented X3      HEENT:  No Erythema, No Exudates      Neck:  Supple, No Masses or JVD      Respiratory:  CTAB      Abdomen:  Soft, No NABS, No Masses, No Hernias      Cardiovascular:  No Chest Tenderness, Regular Rate and Rhythm      Lymphatic:  No Neck      Extremeties:  Pulses Positive all 4 Ext      Neurological:  Mental Status WNL      Skin:  No Rash, No Cellulitis      Psychiatric:  Appropriate Affect            Imaging/Interpretation      PET/CT:1. Thickening of the distal esophagus at the gastroesophageal junction.      This area is       hypermetabolic with a maximum SUV of 9.3.  Findings would be most compatible     with esophageal       carcinoma.  Upper endoscopy is recommended if this has not yet been performed.      There is some       hypermetabolic metastatic lymph node in the right peritracheal space measuring     1.3 cm in size with       maximum SUV of 9.5.  No abnormal hypermetabolic activity noted elsewhere within     the neck, chest,       abdomen, or pelvis.            This is a 57-year-old female with clinical stage II or 3 distal esophageal     adenocarcinoma. We did have a sandra and candid discussion about the nature of     the treatment of esophageal cancer including multimodality therapy with     neoadjuvant chemotherapy and radiation followed by surgery. I briefly outlined     the preop, perioperative and postoperative course. We also discussed in regards     to the staging forgoing endoscopic ultrasound at  this point time as the patient     is anxious to start therapy and we feel as though endoscopic findings would not     change our treatment algorithm. I will plan to see her back in 2-3 weeks after     the completion of neoadjuvant therapy with a restaging PET scan. At this point     time we will also obtain pulmonary function test in anticipation of operative     resection            PREVENTION      Hx Influenza Vaccination:  Yes      Date Influenza Vaccine Given:  Oct 1, 2017      Influenza Vaccine Declined:  No      2 or More Falls Past Year?:  No      Fall Past Year with Injury?:  No      Hx Pneumococcal Vaccination:  No      Encouraged to follow-up with:  PCP regarding preventative exams.      Chart initiated by      ALVINO PATEL CMA                 Disclaimer: Converted document may not contain table formatting or lab diagrams. Please see [employer] [facility] for the authenticated document.

## 2021-05-28 NOTE — PROGRESS NOTES
"Patient: DARINEL PEDROZA     Acct: [id number]     Report: #[id number]  UNIT #: [id number]     : 1960    Encounter Date:2018  PRIMARY CARE: MAIK VILLA  ***Signed***  --------------------------------------------------------------------------------------------------------------------  Visit Type      New Patient Visit            Chief Complaint      ESOPHAGEAL CANCER      Intent of Therapy:  Curative            Referring Provider/Copies To      Referring Provider:  Cameron Lin            Allergies      Coded Allergies:             NO KNOWN DRUG ALLERGIES (Unverified  Allergy, Unknown, 18)            Medications      Last Reconciled on 18 11:22 by NANCY KNOX      No Medication Information Entered            Medications Reviewed:  Changes made to meds            History and Present Illness      Past Oncology Illness History      Mrs. Pedroza is a very pleasant 57-yr-old female who presented with complaints     of food becoming \"clogged\" in her lower esophagus.  Indicates liquids usually     do not feel that way except with eating foods.  She has experienced     approximately 20lbs wt loss over the past 2-3mo.  She was referred to Dr. Lin and EGD/colonoscopy were completed 18.  The esophagus was found to     have a fungating mass of malignant appearance in the gastroesophageal junction.      The stomach appeared normal; however biopsies taken to eval for gastritis.     The duodenum was normal.  Colonoscopy revealed normal colon.  Unfortunately the     biopsy of the esophageal mass was found to be well-differentiated     adenocarcinoma.  The stomach biopsies were positive for H. pylori.  She was     prescribed treatment for H. pylori.  A PET/CT completed prior to the EGD, , found thickening of the distal esophagus at the gastroesophageal junction.      This area was found to be hypermetabolic with a maximum SUV of 9.3.  Findings     would be most compatible with " "esophageal carcinoma.  There was also a     hypermetabolic metastatic lymph node in the right peritracheal space measuring     1.3 cm in size with maximum SUV of 9.5.  No abnormal hypermetabolic activity     noted elsewhere within the neck, chest, abdomen, or pelvis.            HPI - Oncology Interim      Mrs. Gaitan was referred to oncology to discuss treatment of her esophageal     cancer.  She is very eager to start treatment and beat this cancer.  She     continues to experience food becoming \"clogged\" in her distal esophagus and can     feel a pressure in chest when it occurs.  She denies acute pain.  Patient     recently quit smoking, approximately one week out and utilizing Nicotine patch.     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 She is also interested in the cooling cap with chemotherapy.      She indicates that she is handling her diagnosis better than her spouse and     daughter.  She also has a mother in a nursing home and she is the only caregiver    , as her siblings all passed prior to age 59.  She also indicated she is a     difficult stick for lab work.  Pt denies nausea/vomiting, diarrhea and/or     rectal bleeding.            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            Clinical Staging      Unable to completely stage at this time; at least Stage II d/t positive node     metastasis            Clinical Trial Participant      No            ECOG Performance Status      0            PAST, FAMILY   Past Medical History      Hematology/oncology:  REPORTS HX OF: Skin cancer            Past Surgical History      REPORTS HX OF: Skin cancer removal            Social History      Lives independently:  Yes      Occupation:              Tobacco Use      Tobacco status:  Former smoker            Alcohol Use      Alcohol intake:  None            Substance Use      Substance use:  Denies use  "           REVIEW OF SYSTEMS      General:  Complains of: Weight loss, Denies: Appetite change, Excessive sweating    , Fatigue, Fever, Night sweats, Weight gain, Other      Eyes:  Denies: Blurred vision, Corrective lenses, Diplopia, Eye irritation, Eye     pain, Eye redness, Spots in vision, Vision loss, Other      Ears, nose, mouth, throat:  Denies: Headache, Seizures, Visual Changes, Hearing     loss, Sinus Congestion, Hoarseness, Sore throat, Other      Cardiovascular:  Denies: Chest pain, Irregular heartbeat, Palpitations, Swollen     ankles/legs, Other      Respiratory:  Denies: Chest pain, Shortness of Air, Productive cough, Coughing     blood, Other      Gastrointestinal:  Complains of: Problem swallowing, Frequent heartburn,     Constipation, Denies: Nausea, Vomiting, Diarrhea, Tarry stools, Bloody stools,     Unable to control bowels, Other      Kidney/Bladder:  Denies: Painful Urination, Change in urinary stream, Blood in     urine, Incontinence, Frequent Urination, Decreased urine stream, Other      Musculoskeletal:  Denies: New Back pain, Leg Cramps, Painful Joints, Swollen     Joints, Muscle Pain, Muscle weakness, Other      Skin:  DENIES: Jaundice, Easy Bleeding, Lesions/changes in moles, Nail changes,     Skin Discoloration, Rash, Other      Neurological:  Denies: Dizziness, Fainting, Numbness\Tingling, Paralysis,     Seizures, Other      Psychiatric:  Complains of: AAO X 3, Denies: Anxiety, Panic attacks, Depression    , Memory loss, Other      Endocrine:  DiabetesThyroid DisorderOsteoporosisEndocrine Other      Hematologic/lymphatic:  Denies: Bruising, Bleeding, Enlarged Lymph Nodes,     Recurrent infections, Other      Reproductive:  Denies Pregnant, Denies Menopause, Denies Still Menstruating,     Denies Heavy Periods, Denies Other            VITAL SIGNS,PAIN/FATIGUE SCORE      Vitals      Height 5 ft 2.48 in / 158.7 cm      Weight 129 lbs 6.560 oz / 58.7 kg      BSA 1.62 m2      BMI 23.3 kg/m2       Temperature 99.1 F / 37.28 C - Temporal      Pulse 88      Respirations 16            Pain Score      Experiencing any pain?:  No            General Appearance:  Alert, Oriented X3, Cooperative, No acute distress      Eyes:  Anicteric Sclerae, Moist Conjunctiva      Neck:  Supple, No Masses or JVD      Respiratory:  CTAB, Normal Respiratory Effort      Abdomen\Gastro:  Soft, No NABS, No Masses, No Hepatosplenomegaly      Cardio:  RRR, No Murmur, No, Peripheral Edema      Psychiatric:  Appropriate Affect, Intact Judgement, AAO x3      Neuro:  No Focal Sensory Deficit      Muscularskeletal:  Normal Gait and Station, Full muscle strength\tone      Extremities:  No Digital Cyanosis, No Digital Ischemia      Lymphatic:  No Cervical, No Supraclavicular, No Infraclavicular, No Axillary            PREVENTION      Hx Influenza Vaccination:  No      2 or More Falls Past Year?:  No      Fall Past Year with Injury?:  No      Hx Pneumococcal Vaccination:  No      Encouraged to follow-up with:  PCP regarding preventative exams.      Chart initiated by      ENEIDA VELAZCO MA            IMPRESSION/PLAN      Impression      adenocarcinoma of the esophagus. Tx, N1, M0. At least stage II based on node     positive disease on PET scan. ECOG PS 0.            Diagnosis      Esophagus cancer       Malignant neoplasm of lower third of esophagus - C15.5       Malignant neoplasm of esophagus location: lower third      Based on the NCCN, Pt is a good candidate for multimodality therapy with     neoadjuvant chemoRT followed by resection. She has an appt with CT surgery next     week to discuss EUS for T staging and surgical options. She will be referred to     Dr. Morataya, Rad Onc for evaluation. I discussed chemotherapy with the patient     using weekly carboplatin plus Taxol in conjunction with radiation. Side effects    , risk and benefits discussed in detail with patient. Written teaching     information provided. She is agreeable to the  chemotherapy. Lab work will be     obtained today to ensure adequate in organ functions and blood counts. I will     send prescriptions for Zofran and Compazine as needed for chemotherapy nausea.     I will discuss the case with Dr. Morataya and Dr. Huerta.      She will be referred to dietitian for the ongoing weight loss.            Notes      New Medications      * Ondansetron HCl (Zofran) 8 MG TABLET: 8 MG PO Q8H PRN NAUSEA UNRELIEVED BY     ZOFRAN 30 Days #90      * Prochlorperazine Maleate 10 MG TAB: 10 MG PO QID PRN nausea unrelieved by     Zofran #60      New Diagnostics      * CBC With Auto Diff, Routine       Dx: Esophagus cancer - C15.9      * CMP Comp Metabolic Panel, Routine       Dx: Esophagus cancer - C15.9      New Referrals      * Radiation Therapy, As Soon As Possible       REFER TO DR. MORATAYA        Dx: Esophagus cancer - C15.9      * Diet Non Diabetic, As Soon As Possible       Dx: Esophagus cancer - C15.9            Patient Education            Carboplatin Injection      Chemotherapy      Esophageal Cancer      Paclitaxel Injection      Patient Education Provided:  Yes                 Disclaimer: Converted document may not contain table formatting or lab diagrams. Please see [employer] for the authenticated document.